# Patient Record
Sex: MALE
[De-identification: names, ages, dates, MRNs, and addresses within clinical notes are randomized per-mention and may not be internally consistent; named-entity substitution may affect disease eponyms.]

---

## 2019-12-11 ENCOUNTER — HOSPITAL ENCOUNTER (OUTPATIENT)
Dept: HOSPITAL 97 - 4TH | Age: 64
Setting detail: OBSERVATION
LOS: 1 days | Discharge: HOME | End: 2019-12-12
Attending: INTERNAL MEDICINE | Admitting: INTERNAL MEDICINE
Payer: COMMERCIAL

## 2019-12-11 VITALS — OXYGEN SATURATION: 98 %

## 2019-12-11 VITALS — BODY MASS INDEX: 29 KG/M2

## 2019-12-11 DIAGNOSIS — I10: ICD-10-CM

## 2019-12-11 DIAGNOSIS — Z96.652: ICD-10-CM

## 2019-12-11 DIAGNOSIS — R07.9: Primary | ICD-10-CM

## 2019-12-11 DIAGNOSIS — J30.9: ICD-10-CM

## 2019-12-11 DIAGNOSIS — Z23: ICD-10-CM

## 2019-12-11 DIAGNOSIS — E78.2: ICD-10-CM

## 2019-12-11 DIAGNOSIS — N40.0: ICD-10-CM

## 2019-12-11 LAB
ALBUMIN SERPL BCP-MCNC: 3.8 G/DL (ref 3.4–5)
ALP SERPL-CCNC: 78 U/L (ref 45–117)
ALT SERPL W P-5'-P-CCNC: 23 U/L (ref 12–78)
AST SERPL W P-5'-P-CCNC: 17 U/L (ref 15–37)
BUN BLD-MCNC: 18 MG/DL (ref 7–18)
CKMB CREATINE KINASE MB: < 1 NG/ML (ref 0.3–3.6)
GLUCOSE SERPLBLD-MCNC: 102 MG/DL (ref 74–106)
HCT VFR BLD CALC: 36.3 % (ref 39.6–49)
LYMPHOCYTES # SPEC AUTO: 0.7 K/UL (ref 0.7–4.9)
MAGNESIUM SERPL-MCNC: 2.5 MG/DL (ref 1.8–2.4)
PMV BLD: 7.5 FL (ref 7.6–11.3)
POTASSIUM SERPL-SCNC: 5.2 MMOL/L (ref 3.5–5.1)
RBC # BLD: 3.85 M/UL (ref 4.33–5.43)
TROPONIN I: < 0.02 NG/ML (ref 0–0.04)
TSH SERPL DL<=0.05 MIU/L-ACNC: 1.02 UIU/ML (ref 0.36–3.74)
UA DIPSTICK W REFLEX MICRO PNL UR: (no result)

## 2019-12-11 PROCEDURE — 84443 ASSAY THYROID STIM HORMONE: CPT

## 2019-12-11 PROCEDURE — 93970 EXTREMITY STUDY: CPT

## 2019-12-11 PROCEDURE — 84484 ASSAY OF TROPONIN QUANT: CPT

## 2019-12-11 PROCEDURE — 82550 ASSAY OF CK (CPK): CPT

## 2019-12-11 PROCEDURE — 78452 HT MUSCLE IMAGE SPECT MULT: CPT

## 2019-12-11 PROCEDURE — 93306 TTE W/DOPPLER COMPLETE: CPT

## 2019-12-11 PROCEDURE — 85025 COMPLETE CBC W/AUTO DIFF WBC: CPT

## 2019-12-11 PROCEDURE — 83735 ASSAY OF MAGNESIUM: CPT

## 2019-12-11 PROCEDURE — 93017 CV STRESS TEST TRACING ONLY: CPT

## 2019-12-11 PROCEDURE — 71275 CT ANGIOGRAPHY CHEST: CPT

## 2019-12-11 PROCEDURE — 36415 COLL VENOUS BLD VENIPUNCTURE: CPT

## 2019-12-11 PROCEDURE — 90471 IMMUNIZATION ADMIN: CPT

## 2019-12-11 PROCEDURE — 85379 FIBRIN DEGRADATION QUANT: CPT

## 2019-12-11 PROCEDURE — 80053 COMPREHEN METABOLIC PANEL: CPT

## 2019-12-11 PROCEDURE — 71046 X-RAY EXAM CHEST 2 VIEWS: CPT

## 2019-12-11 PROCEDURE — 82553 CREATINE MB FRACTION: CPT

## 2019-12-11 PROCEDURE — 81003 URINALYSIS AUTO W/O SCOPE: CPT

## 2019-12-11 PROCEDURE — 93005 ELECTROCARDIOGRAM TRACING: CPT

## 2019-12-11 PROCEDURE — 90670 PCV13 VACCINE IM: CPT

## 2019-12-11 NOTE — ECHO
HEIGHT: 5 ft 6 in   WEIGHT: 180 lb 0 oz   DATE OF STUDY: 12/11/19   REFER DR: Mando Albert MD

2-DIMENSIONAL: YES

         M.MODE: YES

     DOPPLER: YES

    COLOR FLOW: YES

    

                        TDS:  NO

    PORTABLE: NO

     DEFINITY:  NO

    BUBBLE STUDY: NO

    

    

DIAGNOSIS:  CONGESTIVE HEART FAILURE



CARDIAC HISTORY:  

CATHERIZATION: NO

    SURGERY: NO

    PROSTHETIC VALVE: NO

    PACEMAKER: NO

    

    

MEASUREMENTS (cm)

    DIASTOLIC (NORMALS)                 SYSTOLIC (NORMALS)

IVSd                 1.0 (0.6-1.2)                    LA Diam 3.3 (1.9-4.0)     LVEF       
  57%  

LVIDd               3.8 (3.5-5.7)                        LVIDs      2.7 (2.0-3.5)     %FS  
        30%

LVPWd             1.0 (0.6-1.2)

Ao Diam           2.7 (2.0-3.7)



2 DIMENSIONAL ASSESSMENT:

RIGHT ATRIUM:                   NORMAL

    LEFT ATRIUM:       NORMAL

    

    RIGHT VENTRICLE:            NORMAL

    LEFT VENTRICLE: NORMAL

    

    TRICUSPID VALVE:             NORMAL

    MITRAL VALVE:     NORMAL

    

    PULMONIC VALVE:             NORMAL

    AORTIC VALVE:     MILD SCLEROSIS

    

    PERICARDIAL EFFUSION: NONE

    AORTIC ROOT:      NORMAL

    

    

LEFT VENTRICULAR WALL MOTION:     NORMAL.



DOPPLER/COLOR FLOW:     NO AORTIC STENOSIS OR AORTIC REGURGITATION. NORMAL DOPPLER.



COMMENTS:      NORMAL LEFT VENTRICULAR EJECTION FRACTION. AORTIC SCLEROSIS WITH NO AORTIC 
STENOSIS/ AORTIC REGURGITATION. 



TECHNOLOGIST:   JAZMINE HARRISON

## 2019-12-11 NOTE — RAD REPORT
EXAM DESCRIPTION:  RAD - Chest Pa And Lat (2 Views) - 12/11/2019 11:52 am

 

CLINICAL HISTORY:  chest pain

Chest pain.

 

COMPARISON:  CHEST PA AND LAT 2 VIEW dated 2/10/2011; CHEST PA AND LAT 2 VIEW dated 1/4/2005

 

FINDINGS:  The lungs are clear. The heart is normal in size. No displaced fractures.

 

IMPRESSION:  No acute or concerning finding suspected.

## 2019-12-11 NOTE — EKG
Test Date:    2019-12-11               Test Time:    11:59:33

Technician:   GERDA                                     

                                                     

MEASUREMENT RESULTS:                                       

Intervals:                                           

Rate:         68                                     

AR:           196                                    

QRSD:         94                                     

QT:           396                                    

QTc:          421                                    

Axis:                                                

P:            25                                     

AR:           196                                    

QRS:          -16                                    

T:            20                                     

                                                     

INTERPRETIVE STATEMENTS:                                       

                                                     

Normal sinus rhythm

Normal ECG

Compared to ECG 01/04/2005 14:16:00

No significant changes



Electronically Signed On 12-11-19 13:22:59 CST by Vito Noyola

## 2019-12-11 NOTE — RAD REPORT
EXAM DESCRIPTION:  US - Extrem Venous W Compress Ceasar - 12/11/2019 6:02 pm

 

CLINICAL HISTORY:  rule out DVT

Bilateral leg edema and swelling.

 

COMPARISON:  No comparisons

 

TECHNIQUE:  Real-time sonographic interrogation of the left and right lower extremity deep venous sys
tems was performed.

 

FINDINGS:  Normal compressibility, flow augmentation, phasic flow and spontaneous flow is identified 
in both the left and right lower extremity deep venous systems.

 

IMPRESSION:  No sonographic evidence of left or right lower extremity deep venous thrombosis.

## 2019-12-11 NOTE — RAD REPORT
EXAM DESCRIPTION:  CT - Chest For Pe Angio - 12/11/2019 2:41 pm

 

CLINICAL HISTORY:   Chest pain

 

COMPARISON:  December 11, 2019 chest x-ray

 

TECHNIQUE:  Dynamically enhanced axial 3 mm thick images of the chest were obtained during administra
tion of <100> mL Isovue 370 IV contrast. Coronal and oblique reconstruction images were generated and
 reviewed. Exam utilizes a protocol for optimal evaluation of pulmonary arterial tree.

 

Maximum intensity projections 3D imaging was utilized

 

All CT scans are performed using dose optimization technique as appropriate and may include automated
 exposure control or mA/KV adjustment according to patient size.

 

FINDINGS:  A pulmonary embolus is not seen.

 

A thoracic aortic aneurysm is not noted.

 

A pleural effusion is not seen. A pericardial effusion is not seen.

 

A lung consolidation is not present.

 

IMPRESSION:  Negative for a pulmonary embolism.

## 2019-12-11 NOTE — XMS REPORT
Patient Summary Document

 Created on:2019



Patient:JEREMIAH NAYAK

Sex:Male

:1955

External Reference #:[id number]





Demographics







 Address  [address] [place] [address]

 

 Home Phone  [phone]

 

 Work Phone  [phone]

 

 Email Address  [email or web address]

 

 Preferred Language  Unknown

 

 Marital Status  Unknown

 

 Jehovah's witness Affiliation  Unknown

 

 Race  Unknown

 

 Additional Race(s)  Unavailable

 

 Ethnic Group  Unknown









Author







 Organization  [facility]

 

 Address  [address]



   [place] [address]

 

 Phone  [phone]









Support







 Name  Relationship  Address  Phone

 

 YEFRI NAYAK  Unavailable  [address]  [phone]



     [place] [address]  

 

 YEFRI NAYAK  Unavailable  [address]  [phone]



     [place] [address]  

 

 JEREMIAH NAYAK  Unavailable  .  [phone]









Care Team Providers







 Name  Role  Phone

 

 Unavailable  Unavailable  Unavailable









Payers







 Payer Name  Policy Type  Policy Number  Effective Date  Expiration Date







Problems

This patient has no known problems.



Allergies, Adverse Reactions, Alerts







 Allergy  Allergy  Status  Severity  Reaction(s)  Onset  Inactive  Treating  
Comments



 Name  Type        Date  Date  Clinician  

 

 No Known  DA  Active  U    2018-10      



 Drug          -      



 Allergies          00:00:0      



           0      

 

 pollen  DA  Active  SV    2018-10      



 extracts          -      



           00:00:0      



           0      

 

 No Known  DA  Active  U    2018-10      



 Allergies                



           00:00:0      



           0      







Medications

This patient has no known medications.



Results







 Test Description  Test Time  Test Comments  Text Results  Atomic Results  
Result Comments









 - XR KNEE 1 OR 2 V   2019 15:14:00     Patient Name: JEREMIAH NAYAK  



             Unit No: [id number]        EXAMS:  



         



             CPT CODE:       [id number] XR KNEE  



       1 OR 2 V LT  



       [address]                      LEFT KNEE 2  



       VIEWS PORTABLE                 COMMENT:  



                       The patient is status  



       post joint replacement which is  



       articulating       normally.  



               ** Electronically Signed by  



       Edmund Galaviz MD **            **  



                on 2019 at 1514  



         **                      Reported and  



       signed by: Edmund Galaviz MD  



                                           CC:  



       Joe Holly MD  



         



         



                         Technologist: ARIS BROWN (RT.R)  



               Transcribed D/T: 2019  



       (0083) [id number].[id number]  



             [facility]  



         NAME: JEREMIAH NAYAK  



        [address]  



       PHYS: Joe Sanchez MD  



                                          :  



       1955 AGE: 64      SEX: M  



       [place], [place] [address]  



       ACCT NO: [id number] LOC: Y[address]  



       PHONE #: [phone]  



       EXAM DATE: 2019 STATUS: DIS IN  



        FAX #: [phone]               RAD  



       #:             D/C DT   2019  



       PAGE  1                       Signed  



       Report  



       Patient Name: JEREMIAH NAYAK  



            Unit No: [id number]        EXAMS:  



         



            CPT CODE:       [id number] XR KNEE  



       1 OR 2 V LT  



       [id number]                <Continued>  Orig  



       Print D/T: S: 2019 (1517)  



         



                               [facility]           NAME:  



       JEREMIAH NAYAK              [address]  



       [facility]                     PHYS:  



       Joe Sanchez MD  



                                    :  



       1955 AGE: 64      SEX: M  



       [place], [place] [address]  



       ACCT NO: [id number] LOC: Y[address] A  



       PHONE #: [phone]  



       EXAM DATE: 2019 STATUS: DIS IN  



        FAX #: [phone]               RAD  



       #:             D/C DT   2019  



       PAGE  2                       Signed  



       Report  









 BASIC METABOLIC PANEL  2019 06:51:00    









   Test Item  Value  Reference Range  Comments









 SODIUM (test code=NA)  138 mmol/L  136-145  

 

 POTASSIUM (test code=K)  4.7 mmol/L  3.5-5.1  

 

 CHLORIDE (test code=CL)  101.0 mmol/L    

 

 CARBON DIOXIDE (test code=CO2)  25.1 mmol/L  21-32  

 

 GLUCOSE (test code=GLU)  110 mg/dL    

 

 BLOOD UREA NITROGEN (test  15 mg/dL  7-18  



 code=BUN)      

 

 GLOMERULAR FILTRATION RATE (test  79.3  >60  Unit of measure:   mL/min/1.73



 code=GFR)      [id number] Range:Healthy



       Adults          >90



       mL/min/1.73 m2 For Chronic



       Kidney Disease:     Stage II



         Mild Decrease in GFR



       60-90     Stage III



       Moderate Decrease in GFR



       30-59     Stage IV     Severe



       Decrease in GFR      15-29



       Stage V      Kidney Failure



                  <15

 

 CREATININE (test code=CREAT)  0.95 mg/dL  0.55-1.30  

 

 CALCIUM (test code=CA)  7.3 mg/dL  8.2-10.1  



HGB   [id number] 05:48:00





 Test Item  Value  Reference Range  Comments

 

 HEMOGLOBIN (test code=HGB)  11.2 g/dL  12-16  

 

 HEMATOCRIT (test code=HCT)  33.2 %  37-47  



COMPREHENSIVE METABOLIC PANEL2019-10-31 13:03:00





 Test Item  Value  Reference Range  Comments

 

 SODIUM (test code=NA)  139 mmol/L  136-145  

 

 POTASSIUM (test code=K)  4.2 mmol/L  3.5-5.1  

 

 CHLORIDE (test code=CL)  102.0 mmol/L    

 

 CARBON DIOXIDE (test code=CO2)  27.5 mmol/L  21-32  

 

 GLUCOSE (test code=GLU)  96 mg/dL    

 

 BLOOD UREA NITROGEN (test  16 mg/dL  7-18  



 code=BUN)      

 

 GLOMERULAR FILTRATION RATE  91.4  >60  Unit of measure:



 (test code=GFR)      mL/min/1.73 [id number]



       Range:Healthy Adults



          >90 mL/min/1.73 m2 For



       Chronic Kidney Disease:



        Stage II     Mild



       Decrease in GFR



       60-90     Stage III



       Moderate Decrease in GFR



        30-59     Stage IV



       Severe Decrease in GFR



        15-29     Stage V



       Kidney Failure



         <15

 

 CREATININE (test code=CREAT)  0.84 mg/dL  0.55-1.30  

 

 TOTAL PROTEIN (test code=PROT)  7.2 g/dL  6.4-8.2  

 

 ALBUMIN (test code=ALB)  4.2 g/dL  3.4-5.0  

 

 GLOBULIN (test code=GLOB)  3.0 g/dL  2.2-4.2  

 

 ALBUMIN/GLOBULIN RATIO (test  1.4  0.7-2.0  



 code=A/G)      

 

 CALCIUM (test code=CA)  9.4 mg/dL  8.2-10.1  

 

 BILIRUBIN TOTAL (test  0.60 mg/dL  0.2-1.00  



 code=BILT)      

 

 SGOT/AST (test code=AST)  34.0 U/L  15-37  

 

 SGPT/ALT (test code=ALT)  34.0 U/L  12-78  Please note new normal



       range.

 

 ALKALINE PHOSPHATASE TOTAL  53 U/L    



 (test code=ALKP)      



CBC W/AUTO DIFF2019-10-31 12:43:00





 Test Item  Value  Reference Range  Comments

 

 WHITE BLOOD CELL (test code=WBC)  5.3 K/mm3  5.7-10.5  

 

 RED BLOOD CELL (test code=RBC)  4.40 M/mm3  4.2-5.4  

 

 HEMOGLOBIN (test code=HGB)  13.7 g/dL  12-16  

 

 HEMATOCRIT (test code=HCT)  40.7 %  37-47  

 

 MEAN CELL VOLUME (test code=MCV)  93 fL  80-98  

 

 MEAN CELL HGB (test code=MCH)  31.1 pg  27-34  

 

 MEAN CELL HGB CONCENTRATION (test code=MCHC)  33.7 g/dL  30.8-34.1  

 

 RED CELL DISTRIBUTION WIDTH (test code=RDW)  12.7 %  11-16  

 

 PLT (test code=PLT)  258 K/mm3  130-400  

 

 MEAN PLATELET VOLUME (test code=MPV)  9.8 fL  8.9-12.1  

 

 NEUTROPHIL % (test code=NT%)  62.8 %  45-70  

 

 LYMPHOCYTE % (test code=LY%)  24.4 %  20-40  

 

 MONOCYTE % (test code=MO%)  9.7 %  3-10  

 

 EOSINOPHIL % (test code=EO%)  1.9 %  1-5  

 

 BASOPHIL % (test code=BA%)  0.8 %  0.0-1.1  

 

 NEUTROPHIL # (test code=NT#)  3.32 K/mm3  2.00-7.50  

 

 LYMPHOCYTE # (test code=LY#)  1.29 K/mm3  1.50-4.00  

 

 MONOCYTE # (test code=MO#)  0.51 K/mm3  0.2-0.8  

 

 EOSINOPHIL # (test code=EO#)  0.10 K/mm3  0.04-0.4  

 

 BASOPHIL # (test code=BA#)  0.04 K/mm3  0.02-0.10  

 

 MANUAL DIFF REQUIRED (test code=MDIFF)  NO  MANUAL DIFF  

 

 NUCLEATED RED BLOOD CELL (test code=NRBC)  0 %  0-0

## 2019-12-12 VITALS — SYSTOLIC BLOOD PRESSURE: 120 MMHG | TEMPERATURE: 98.7 F | DIASTOLIC BLOOD PRESSURE: 75 MMHG

## 2019-12-12 NOTE — RAD REPORT
EXAM DESCRIPTION:  NM - Rest Stress Cardiac Imaging - 12/12/2019 9:27 am

 

CLINICAL HISTORY:  CP

Chest pain.

 

COMPARISON:  No comparisons

 

TECHNIQUE:  The patient was administered approximately 10mCi of Tc 99m Sestamibi prior to resting SPE
CT imaging of the heart. The patient was then administered approximately 30 mCi of Tc 99m Sestamibi f
ollowing exercise or pharmacologic stress. Multiplanar SPECT images were reviewed.

 

FINDINGS:  No stress induced ischemic defect is seen to suggest stress induced ischemia. No fixed def
ect is seen to suggest hibernating myocardium or scarred myocardium.

 

The end diastolic volume is 94 ml, the end systolic volume is 40 ml, and the ejection fraction is 57 
%.

 

 

IMPRESSION:  No stress induced ischemia.

## 2019-12-12 NOTE — TREADPHA
DX:  CONGESTIVE HEART FAILURE



Date of Study: 12/12/2019





Ht: 5 6 

Wt:  179 lb 12.8 oz



Consulting Physician:  FLOR







MEDICATIONS:  ASPIRIN



HISTORY:  64 YEAR OLD MALE, COMLAINTS OF CHEST PAIN, HISTORY OF HYPERTENSION, 
HYPERLIPIDEMIA, NON-SMOKER, OCCASIONAL DRINKER.



PHYSICIAL EXAMINATION: 

            



RESTING B.P.:  137/85

RESTING H.R.:  81





RESTING EKG:  NORMAL



                    

            

PROTOCOL:  PHARMACOLOGIC

EXERCISE TIME:  3:30 

B.P. AT PEAK STRESS:  129/82





IMPRESSION:  LEXISCAN INJECTED FOLLOWED BY CARDIOLITE PER PROTOCOL.  SEE NUCLEAR MEDICINE 
REPORT.  NO SUPRAVENTRICULAR TACHYCARDIA, VENTRICULAR TACHYCARDIA, PREMATURE ATRIAL 
COMPLEXES, PREMATURE VENTRICULAR COMPLEXES.  PATIENT REPORTED CHEST PAIN, THREE OUT OF TEN 
ON PAIN SCALE.

## 2019-12-12 NOTE — HP
Date of Admission:  12/11/2019



Chief Complaint:  Chest pain.



History Of Present Illness:  This is a 64-year-old male patient, who came in to office today for his 
routine physical exam and informed me that he had left knee replacement surgery done on November 19, 2019.  This was done in [place]; and upon further questioning, he informed me that he did not take an
y anticoagulation therapy for DVT prophylaxis after he was discharged from hospital.  He stayed in [facility] overnight.  He is recovering well from this surgery, but also reported that for last 2 weeks h
e is having chest pain and chest pain is just lateral to the nipple area on both side and it lasts of
f and on for about hour or so in morning time.  Denies any association with meal or activity.  No oth
er associated symptoms like nausea, vomiting, diaphoresis.  No radiation of pain.  No shortness of br
eath.  No cough, cold, congestion.  No rash.  After the patient was evaluated at the office, decision
 was made to admit him to hospital for further evaluation and management of his chest pain problem.



Allergies:  NO KNOWN ALLERGIES.



Medications:  List reviewed.



Review of Systems:

Cardiovascular:  As mentioned above. 



All other systems reviewed and negative.



Past Medical History:  Allergic rhinitis, impaired fasting glucose, hypertension, mixed hyperlipidemi
a, benign prostatic hypertrophy, high PSA for which he had negative prostate biopsy, leukocytopenia, 
anemia, osteoporosis.



Past Surgical History:  Significant for prostate biopsy, bilateral knee replacement surgery, and surg
syed on his thumb and foot surgery.



Family History:  Father had Alzheimer disease.  Mother, hypertension and benign brain tumor.



Social History:  Prior history of smoking not at present time.  Use of alcohol, drinks about 6-12 bee
rs a week.



Physical Examination:

Vital Signs:  /65, pulse 76, respiratory rate 16, temperature 98.1.  Weight 178 pounds, height 
67 inches. 

General:  Awake, alert, oriented, not in distress. 

HEENT:  Head atraumatic, normocephalic.  Conjunctivae nonerythematous.  Sclerae white.  Mouth, no thr
ush or edema noted.  Ears/Nose, no mass, lesion, discharge noted. 

Neck:  Supple. No JVD, lymph nodes, bruit, thyromegaly noted. 

Lungs:  Bilateral good equal air entry. Clear to auscultation. No rhonchi.  No rales. 

Heart:  Normal heart sounds, no murmur or gallop. 

Abdomen:  Soft, bowel sounds normal. No guarding, rigidity, tenderness, mass, hepatosplenomegaly, dis
tention, or bruit noted. 

Extremities:  Left knee has surgical scar from recent surgery of knee replacement and his left knee s
urgical scar is healing well.  No evidence of any redness, swelling, discharge, bleeding.  Patient ha
s some bruising in the left lower leg, which is expected from recent surgery. 

Skin:  No rash, ulcer, cellulitis. 

Lymphatics:  No lymph node enlargement in neck, supraclavicular, infraclavicular region. 

Neuro:  No focal neurological deficit. 

Chest:  Unremarkable. 

External Genitalia:  Deferred. 

Rectal:  Deferred.



Laboratory Data:  White count 5.3, hemoglobin 12.4, platelets 306.  Sodium 138, potassium 5.2, chlori
de 105, bicarb 31, BUN 18, creatinine 0.97, glucose 102.  Liver function tests unremarkable.  Troponi
n less than 0.02.  TSH 1.02.  D-dimer elevated at 6441.  Urinalysis negative.  Chest x-ray, no acute 
cardiopulmonary changes.  EKG, normal sinus rhythm, normal EKG.



Impression:  

1.Chest pain.

2.Hypertension.

3.Mixed hyperlipidemia.

4.Benign prostatic hypertrophy.

5.Impaired fasting glucose.

6.Allergic rhinitis.



Plan:  Admit patient to hospital for further evaluation and management of this problem.  Patient is a
ppropriate for observation.  Patient's cardiac enzymes are negative.  EKG is unremarkable.  Our ramana
rn is that we need to rule out possibility of pulmonary embolism and CAT scan of the chest per PE pro
tocol was done after patient was admitted to the hospital, which came back negative for pulmonary emb
olism and venous Doppler of leg was negative for DVT.  Tomorrow morning, we will do Lexiscan stress t
est.  Echocardiogram done today was unremarkable.  I will see him tomorrow morning for followup.  Laurie everett was given aspirin 162 mg after admission to the hospital and will continue aspirin 81 mg daily.





BOO/MODL

DD:  12/11/2019 19:55:29Voice ID:  [id number]

## 2019-12-13 NOTE — DS
Date of Discharge:  12/12/2019



Disposition:  Discharged to go home.



Physical Examination:

HEENT:  Unremarkable. 

Lungs:  Clear to auscultation. 

Heart:  Sounds normal. 

Abdomen:  Soft.  Bowel sounds normal.  No guarding, rigidity, tenderness, or 
distention. 

Extremities:  No leg edema.



Final Diagnoses:  

1.   Chest pain.

2.   Hypertension.

3.   Mixed hyperlipidemia.

4.   Benign prostatic hypertrophy.

5.   Impaired fasting glucose.

6.   Allergic rhinitis.



Hospital Course:  A 64-year-old pleasant male patient, who was admitted to the 
hospital after he came into office and complaining of chest pain.  Please see 
dictated H and P for more information.  Cardiac enzymes remained negative.  
Chest x-ray was negative.  CAT scan of the chest per PE protocol was negative 
for pulmonary embolism or any other acute lung findings.  The patient had an 
echocardiogram done, which was unremarkable and today he had a negative 
Lexiscan stress test.  Patient's chest pain, I will consider that to be 
musculoskeletal in nature.  He had some anti-inflammatory medication, which is 
ketorolac and this was prescribed to him by his orthopedic surgeon after recent 
knee surgery and he was advised to take that and I will see him next week on 
Wednesday for followup.





BOO/KATHRYN

DD:  12/12/2019 21:13:58   Voice ID:  [id number]

DT:  12/13/2019 04:01:39   Report ID:  [id number]

MARILIN

## 2020-08-30 ENCOUNTER — HOSPITAL ENCOUNTER (EMERGENCY)
Dept: HOSPITAL 97 - ER | Age: 65
Discharge: LEFT BEFORE DISCHARGE | End: 2020-08-30
Payer: COMMERCIAL

## 2020-08-30 DIAGNOSIS — Z53.21: Primary | ICD-10-CM

## 2020-08-30 PROCEDURE — 99281 EMR DPT VST MAYX REQ PHY/QHP: CPT

## 2020-08-30 NOTE — ER
Nurse's Notes                                                                                     

 [facility]                                                                 

Name: Dereck Dc                                                                         

Age: 64 yrs                                                                                       

Sex: Male                                                                                         

: 1955                                                                                   

MRN: [id number]                                                                                   

Arrival Date: 2020                                                                          

Time: 13:43                                                                                       

Account#: [id number]                                                                            

Bed Waiting                                                                                       

Private MD:                                                                                       

Diagnosis:                                                                                        

                                                                                                  

Presentation:                                                                                     

                                                                                             

13:47 Chief complaint: Patient states: sore throat, nasal drip since Monday. Had a +COVID on  sv  

      last week and had another test and they said it was negative but wants to know which        

      one it is. Pt requesting a COVID test. Coronavirus screen: Client denies travel out of      

      the [place] in the last 14 days. runny nose, sore throat. Ebola Screen: No symptoms or         

      risks identified at this time. Risk Assessment: Do you want to hurt yourself or someone     

      else? Patient reports no desire to harm self or others. Onset of symptoms was 2020.                                                                                   

13:47 Method Of Arrival: Ambulatory                                                           sv  

13:47 Acuity: TD 4                                                                           sv  

13:51 Initial Sepsis Screen: Does the patient meet any 2 criteria? HR > 90 bpm. No. Patient's sv  

      initial sepsis screen is negative. Does the patient have a suspected source of              

      infection? No. Patient's initial sepsis screen is negative.                                 

                                                                                                  

Triage Assessment:                                                                                

13:47 General: Appears in no apparent distress. comfortable, Behavior is calm, cooperative,   sv  

      appropriate for age. Pain: Complains of pain in throat. EENT: Reports pain when             

      swallowing. Neuro: Level of Consciousness is awake, alert, obeys commands, Gait is          

      steady. Respiratory: Respiratory effort is even, unlabored.                                 

                                                                                                  

Historical:                                                                                       

- Allergies:                                                                                      

13:49 No Known Allergies;                                                                     sv  

                                                                                                  

                                                                                                  

                                                                                                  

Vital Signs:                                                                                      

13:51  / 76; Pulse 103; Resp 16; Temp 99.1; Pulse Ox 100% ; Weight 83.91 kg; Height 5   sv  

      ft. 6 in. (167.64 cm);                                                                      

13:51 Body Mass Index 29.86 (83.91 kg, 167.64 cm)                                             sv  

                                                                                                  

ED Course:                                                                                        

13:43 Patient arrived in ED.                                                                  mr  

13:47 Arm band placed on.                                                                     sv  

13:49 Triage completed.                                                                       sv  

                                                                                                  

Administered Medications:                                                                         

No medications were administered                                                                  

                                                                                                  

                                                                                                  

Outcome:                                                                                          

14:32 Eloped from waiting room, before seeing physician Time discovered patient gone:  at 14:32                                                                           

14:32 Patient left the ED.                                                                    sv  

                                                                                                  

Signatures:                                                                                       

Lorenza Dan RN                    RN   sv                                                   

MjAmanda                                 mr                                                   

                                                                                                  

Corrections: (The following items were deleted from the chart)                                    

13:52 13:47 Chief complaint: Patient states: sore throat, nasal drip since Monday sv          sv  

                                                                                                  

**************************************************************************************************

## 2020-08-30 NOTE — XMS REPORT
Continuity of Care Document

                           Created on:2020



Patient:JEREMIAH NAYAK

Sex:Male

:1955

External Reference #:[id number]





Demographics







                          Address                   [address] [place] [address]

 

                          Home Phone                [phone]

 

                          Work Phone                [phone]

 

                          Email Address             [email or web address]

 

                          Preferred Language        English

 

                          Marital Status            Unknown

 

                          Amish Affiliation     Unknown

 

                          Race                      Unknown

 

                          Additional Race(s)        Unavailable

 

                          Ethnic Group              Unknown









Author







                          Organization              [facility]

t

 

                          Address                   [address]



                                                    [place] [address]

 

                          Phone                     [phone]









Support







                Name            Relationship    Address         Phone

 

                YEFRI NAYAK Unavailable     [address] [facility] [phone]

[phone]



                                                [place] [address] 

 

                YEFRI NAYAK Unavailable     [address] [facility] [phone]

[phone]



                                                [place] [address] 

 

                JEREMIAH NAYAK Unavailable     .               [phone]

[phone]









Care Team Providers







                    Name                Role                Phone

 

                    Unavailable         Unavailable         Unavailable









Payers







           Payer Name Policy Type Policy Number Effective Date Expiration Date S

ource







Problems

This patient has no known problems.



Allergies, Adverse Reactions, Alerts







       Allergy Allergy Status Severity Reaction(s) Onset  Inactive Treating Comm

ents 

Source



       Name   Type                        Date   Date   Clinician        

 

       No Known DA     Active U             2018                      [facility]



       Drug                               0-30                        [facility]



       Allergie                             00:00:                      [place]



       s                                  00                          [facility]

 

       pollen DA     Active SV            2018                      [facility]



       extracts                             0-30                        [facility]



                                          00:00:                      [place]



                                          00                          [facility]

 

       No Known DA     Active U             2018                      [facility]



       Allergie                             0-30                        [place]



       s                                  00:00:                      [facility]



                                          00                          [facility]



                                                                      [facility]



                                                                      l







Medications

This patient has no known medications.



Procedures

This patient has no known procedures.



Results







           Test Description Test Time  Test Comments Results    Result Comments 

Source

 

           - XR KNEE 1 OR 2 V 2019             Patient Name:            



           LT         15:14:00              JEREMIAH NAYKA            



                                                       Unit No:            



                                            [id number]            



                                            EXAMS:                



                                                                  



                                                         CPT            



                                            CODE:                 



                                            [id number] XR KNEE 1            



                                            OR 2 V LT             



                                                         [id number]            



                                                                  



                                            LEFT KNEE 2 VIEWS            



                                            PORTABLE              



                                                 COMMENT:            



                                                      The            



                                            patient is status            



                                            post joint            



                                            replacement which            



                                            is articulating            



                                              normally.            



                                                       **            



                                            Electronically            



                                            Signed by Edmund Galaviz MD **            



                                                **                



                                            on 2019 at            



                                            1514            **            



                                                                  



                                            Reported and signed            



                                            by: Edmund Galaviz MD            



                                                                  



                                                           CC:            



                                            Joe Holly MD            



                                                                  



                                                                  



                                                                  



                                                                  



                                                                  



                                                                  



                                            Technologist:            



                                            ARIS BROWN (RT.R)            



                                                                  



                                                                  



                                            Transcribed D/T:            



                                            2019 (7589)            



                                            [email or web address]             



                                                                  



                                            [facility]              



                                            NAME:                 



                                            JEREMIAH NAYAK            



                                                         [address] [place] [address]            



                                                       PHYS:            



                                            Joe Sanchez MD            



                                                                  



                                                                  



                                             : 1955            



                                            AGE: 64      SEX: M            



                                              [place], [place]            



                                            [address]                 



                                             ACCT NO:             



                                            [id number] LOC:            



                                            Y.315 A      PHONE            



                                            #: [phone]            



                                                      EXAM            



                                            DATE: 2019            



                                            STATUS: DIS IN            



                                            FAX #: [phone]            



                                                          RAD            



                                            #:             D/C            



                                            DT   2019            



                                            PAGE  1               



                                                      Signed            



                                            Report                



                                                                  



                                            Patient Name:            



                                            JEREMIAH NAYAK            



                                                       Unit No:            



                                            [id number]            



                                            EXAMS:                



                                                                  



                                                         CPT            



                                            CODE:                 



                                            [id number] XR KNEE 1            



                                            OR 2 V LT             



                                                         [id number]            



                                                                  



                                            <Continued>  Orig            



                                            Print D/T: S:            



                                            2019 (1193)            



                                                                  



                                                                  



                                                                  



                                                      [facility]            



                                                      NAME:            



                                            JEREMIAH NAYAK            



                                                         [address] [place] [address]            



                                                       PHYS:            



                                            Joe Sanchez MD            



                                                                  



                                                                  



                                             : 1955            



                                            AGE: 64      SEX: M            



                                              [place], [place]            



                                            [address]                 



                                             ACCT NO:             



                                            [id number] LOC:            



                                            Y.315 A      PHONE            



                                            #: [phone]            



                                                      EXAM            



                                            DATE: 2019            



                                            STATUS: DIS IN            



                                            FAX #: [phone]            



                                                          RAD            



                                            #:             D/C            



                                            DT   2019            



                                            PAGE  2               



                                                      Signed            



                                            Report                



                                                                  









                    BASIC METABOLIC PANEL 2019 06:51:00 









                      Test Item  Value      Reference Range Interpretation Comme

nts









             SODIUM (test code = NA) 138 mmol/L   136-145      N            

 

             POTASSIUM (test code = K) 4.7 mmol/L   3.5-5.1      N            

 

             CHLORIDE (test code = CL) 101.0 mmol/L        N            

 

             CARBON DIOXIDE (test code = 25.1 mmol/L  21-32        N            



             CO2)                                                

 

             GLUCOSE (test code = GLU) 110 mg/dL           N            

 

             BLOOD UREA NITROGEN (test code 15 mg/dL     7-18         N         

   



             = BUN)                                              

 

             GLOMERULAR FILTRATION RATE 79.3         >60                       U

nit of measure:



             (test code = GFR)                                        mL/min/1.7

3 [id number]



                                                                 Range:Healthy A

dults



                                                                 >90 mL/min/1.73

 m2 For



                                                                 Chronic Kidney 

Disease:



                                                                 Stage II     Mi

ld Decrease in



                                                                 GFR        60-9

0     Stage



                                                                 III    Moderate

 Decrease in



                                                                 GFR    30-59   

  Stage IV



                                                                 Severe Decrease

 in GFR



                                                                 15-29     Stage

 V      Kidney



                                                                 Failure        

       <15

 

             CREATININE (test code = CREAT) 0.95 mg/dL   0.55-1.30    N         

   

 

             CALCIUM (test code = CA) 7.3 mg/dL    8.2-10.1     L            



HGB   [id number] 05:48:00





             Test Item    Value        Reference Range Interpretation Comments

 

             HEMOGLOBIN (test code = HGB) 11.2 g/dL    12-16        L           

 

 

             HEMATOCRIT (test code = HCT) 33.2 %       37-47        L           

 



COMPREHENSIVE METABOLIC PANEL2019-10-31 13:03:00





             Test Item    Value        Reference Range Interpretation Comments

 

             SODIUM (test code = 139 mmol/L   136-145      N            



             NA)                                                 

 

             POTASSIUM (test code = 4.2 mmol/L   3.5-5.1      N            



             K)                                                  

 

             CHLORIDE (test code = 102.0 mmol/L        N            



             CL)                                                 

 

             CARBON DIOXIDE (test 27.5 mmol/L  21-32        N            



             code = CO2)                                         

 

             GLUCOSE (test code = 96 mg/dL            N            



             GLU)                                                

 

             BLOOD UREA NITROGEN 16 mg/dL     7-18         N            



             (test code = BUN)                                        

 

             GLOMERULAR FILTRATION 91.4         >60                       Unit o

f measure:



             RATE (test code = GFR)                                        mL/mi

n/1.73



                                                                 [id number]



                                                                 Range:Healthy



                                                                 Adults         

 >90



                                                                 mL/min/1.73 m2 

For



                                                                 Chronic Kidney



                                                                 Disease:     St

age



                                                                 II     Mild



                                                                 Decrease in GFR



                                                                    60-90     St

age



                                                                 III    Moderate



                                                                 Decrease in GFR



                                                                 30-59     Stage

 IV



                                                                    Severe Decre

ase



                                                                 in GFR      15-

29



                                                                   Stage V



                                                                 Kidney Failure



                                                                          <15

 

             CREATININE (test code 0.84 mg/dL   0.55-1.30    N            



             = CREAT)                                            

 

             TOTAL PROTEIN (test 7.2 g/dL     6.4-8.2      N            



             code = PROT)                                        

 

             ALBUMIN (test code = 4.2 g/dL     3.4-5.0      N            



             ALB)                                                

 

             GLOBULIN (test code = 3.0 g/dL     2.2-4.2      N            



             GLOB)                                               

 

             ALBUMIN/GLOBULIN RATIO 1.4          0.7-2.0      N            



             (test code = A/G)                                        

 

             CALCIUM (test code = 9.4 mg/dL    8.2-10.1     N            



             CA)                                                 

 

             BILIRUBIN TOTAL (test 0.60 mg/dL   0.2-1.00     N            



             code = BILT)                                        

 

             SGOT/AST (test code = 34.0 U/L     15-37        N            



             AST)                                                

 

             SGPT/ALT (test code = 34.0 U/L     12-78        N            Please

 note new



             ALT)                                                normal range.

 

             ALKALINE PHOSPHATASE 53 U/L              N            



             TOTAL (test code =                                        



             ALKP)                                               



CBC W/AUTO DIFF2019-10-31 12:43:00





             Test Item    Value        Reference Range Interpretation Comments

 

             WHITE BLOOD CELL (test code = WBC) 5.3 K/mm3    5.7-10.5     L     

       

 

             RED BLOOD CELL (test code = RBC) 4.40 M/mm3   4.2-5.4      N       

     

 

             HEMOGLOBIN (test code = HGB) 13.7 g/dL    12-16        N           

 

 

             HEMATOCRIT (test code = HCT) 40.7 %       37-47        N           

 

 

             MEAN CELL VOLUME (test code = MCV) 93 fL        80-98        N     

       

 

             MEAN CELL HGB (test code = MCH) 31.1 pg      27-34        N        

    

 

             MEAN CELL HGB CONCENTRATION (test 33.7 g/dL    30.8-34.1    N      

      



             code = MCHC)                                        

 

             RED CELL DISTRIBUTION WIDTH (test 12.7 %       11-16        N      

      



             code = RDW)                                         

 

             PLT (test code = PLT) 258 K/mm3    130-400      N            

 

             MEAN PLATELET VOLUME (test code = 9.8 fL       8.9-12.1     N      

      



             MPV)                                                

 

             NEUTROPHIL % (test code = NT%) 62.8 %       45-70        N         

   

 

             LYMPHOCYTE % (test code = LY%) 24.4 %       20-40        N         

   

 

             MONOCYTE % (test code = MO%) 9.7 %        3-10         N           

 

 

             EOSINOPHIL % (test code = EO%) 1.9 %        1-5          N         

   

 

             BASOPHIL % (test code = BA%) 0.8 %        0.0-1.1      N           

 

 

             NEUTROPHIL # (test code = NT#) 3.32 K/mm3   2.00-7.50    N         

   

 

             LYMPHOCYTE # (test code = LY#) 1.29 K/mm3   1.50-4.00    L         

   

 

             MONOCYTE # (test code = MO#) 0.51 K/mm3   0.2-0.8      N           

 

 

             EOSINOPHIL # (test code = EO#) 0.10 K/mm3   0.04-0.4     N         

   

 

             BASOPHIL # (test code = BA#) 0.04 K/mm3   0.02-0.10    N           

 

 

             MANUAL DIFF REQUIRED (test code = NO           MANUAL DIFF         

      



             MDIFF)                                              

 

             NUCLEATED RED BLOOD CELL (test 0 %          0-0          N         

   



             code = NRBC)

## 2020-09-02 VITALS — SYSTOLIC BLOOD PRESSURE: 148 MMHG | TEMPERATURE: 99.1 F | OXYGEN SATURATION: 100 % | DIASTOLIC BLOOD PRESSURE: 76 MMHG

## 2022-12-16 ENCOUNTER — HOSPITAL ENCOUNTER (EMERGENCY)
Dept: HOSPITAL 97 - ER | Age: 67
Discharge: HOME | End: 2022-12-16
Payer: COMMERCIAL

## 2022-12-16 VITALS — SYSTOLIC BLOOD PRESSURE: 130 MMHG | DIASTOLIC BLOOD PRESSURE: 76 MMHG | OXYGEN SATURATION: 97 %

## 2022-12-16 DIAGNOSIS — Z96.653: ICD-10-CM

## 2022-12-16 DIAGNOSIS — M79.661: Primary | ICD-10-CM

## 2022-12-16 DIAGNOSIS — I10: ICD-10-CM

## 2022-12-16 PROCEDURE — 99283 EMERGENCY DEPT VISIT LOW MDM: CPT

## 2022-12-16 NOTE — RAD REPORT
EXAM DESCRIPTION:  RAD - Tib Fib Right - 12/16/2022 5:18 pm

 

CLINICAL HISTORY:  fall from ladder

Fall, trauma, pain

 

COMPARISON:  No comparisons

 

FINDINGS:  Total knee arthroplasty is noted. No acute fracture or dislocation. Large plantar calcanea
l spur. Tiny posterior calcaneal spur.

## 2022-12-16 NOTE — ER
Nurse's Notes                                                                                     

 [facility]                                                                 

Name: Dereck Dc                                                                         

Age: 67 yrs                                                                                       

Sex: Male                                                                                         

: 1955                                                                                   

MRN: [id number]                                                                                   

Arrival Date: 2022                                                                          

Time: 15:44                                                                                       

Account#: [id number]                                                                            

Bed 20                                                                                            

Private MD: YOLA Albert C                                                                            

Diagnosis: Pain in right lower leg                                                                

                                                                                                  

Presentation:                                                                                     

                                                                                             

15:49 Chief complaint: Patient states: He fell 3 ft from a ladder and landed on the ladder    kb3 

      rung awkwardly causing pain in the right calf at approximately 1200 today. Pt denies        

      pain in right foot, ankle and knee. Coronavirus screen: Vaccine status: Patient reports     

      receiving the 2nd dose of the covid vaccine. Client denies travel out of the [place] in        

      the last 14 days. Ebola Screen: Patient negative for fever greater than or equal to         

      101.5 degrees Fahrenheit, and additional compatible Ebola Virus Disease symptoms            

      Patient denies exposure to infectious person. Patient denies travel to an                   

      Ebola-affected area in the 21 days before illness onset. No symptoms or risks               

      identified at this time. Initial Sepsis Screen: Does the patient meet any 2 criteria?       

      No. Patient's initial sepsis screen is negative. Does the patient have a suspected          

      source of infection? No. Patient's initial sepsis screen is negative. Risk Assessment:      

      Do you want to hurt yourself or someone else? Patient reports no desire to harm self or     

      others. Onset of symptoms was 2022 at 12:00.                                   

15:49 Method Of Arrival: Ambulatory                                                           3 

15:49 Acuity: TD 3                                                                           kb3 

                                                                                                  

Triage Assessment:                                                                                

15:52 General: Appears in no apparent distress. Behavior is calm, cooperative. Pain:          kb3 

      Complains of pain in right calf Pain does not radiate. Pain currently is 7 out of 10 on     

      a pain scale.                                                                               

16:23 Injury Description: no obvious injury or deformity noted to the right leg. some mild    kc6 

      swelling present.                                                                           

                                                                                                  

Historical:                                                                                       

- Allergies:                                                                                      

15:52 No Known Allergies;                                                                     kb3 

- PMHx:                                                                                           

15:52 Hypercholesterolemia; Hypertensive disorder;                                            kb3 

- PSHx:                                                                                           

15:52 Bilateral knee replacement;                                                             kb3 

                                                                                                  

- Immunization history:: Adult Immunizations up to date, Client reports receiving the             

  2nd dose of the Covid vaccine.                                                                  

- Social history:: Smoking status: Patient denies any tobacco usage or history of.                

                                                                                                  

                                                                                                  

Screenin:19 [facility] ED Fall Risk Assessment (Adult) History of falling in the last 3 months,       kc6 

      including since admission Yes- single mechanical fall (1 pt) Confusion or                   

      Disorientation No (0 pts) Intoxicated or Sedated No (0 pts) Impaired Gait No (0 pts)        

      Mobility Assist Device Used No (0 pt) Altered Elimination No (0 pt) Score/Fall Risk         

      Level 0 - 2 = Low Risk. Abuse screen: Denies threats or abuse. Denies injuries from         

      another. Nutritional screening: No deficits noted. Tuberculosis screening: No symptoms      

      or risk factors identified. Fall Risk Fall in past 12 months (25 points). No secondary      

      diagnosis (0 pts). No IV (0 pts). Ambulatory Aid- None/Bed Rest/Nurse Assist (0 pts).       

      Gait- Normal/Bed Rest/Wheelchair (0 pts) Mental Status- Oriented to own ability (0          

      pts). Total Woods Fall Scale indicates No Risk (0-24 pts).                                  

                                                                                                  

Assessment:                                                                                       

16:20 General: Appears in no apparent distress. comfortable, Behavior is calm, cooperative,   kc6 

      appropriate for age. Pain: Complains of pain in right leg and right calf Pain does not      

      radiate. Pain currently is 7 out of 10 on a pain scale. Quality of pain is described as     

      dull, Pain began suddenly, Is continuous, Alleviated by medications, Aggravated by          

      plantar felxion Also complains of no other associated symptoms. Neuro: Koehler             

      Agitation-Sedation Scale (RASS): 0 - Alert and Calm Level of Consciousness is awake,        

      alert, obeys commands, Oriented to person, place, time, situation, Appropriate for age.     

      Cardiovascular: Heart tones S1 S2 present Capillary refill < 3 seconds. Respiratory:        

      Airway is patent Trachea midline Respiratory effort is even, unlabored, Respiratory         

      pattern is regular, symmetrical, Breath sounds are clear bilaterally. GI: No signs          

      and/or symptoms were reported involving the gastrointestinal system. : No signs           

      and/or symptoms were reported regarding the genitourinary system. EENT: No signs and/or     

      symptoms were reported regarding the EENT system. Derm: No signs and/or symptoms            

      reported regarding the dermatologic system. Skin is intact, Skin is pink, warm \T\ dry.     

      Musculoskeletal: Circulation, motion, and sensation intact. Capillary refill < 3            

      seconds, Range of motion: intact in all extremities, Swelling present in right leg and      

      right calf.                                                                                 

17:16 Reassessment: Patient appears in no apparent distress at this time. No changes from     kc6 

      previously documented assessment. Patient and/or family updated on plan of care and         

      expected duration. Pain level reassessed. Patient is alert, oriented x 3, equal             

      unlabored respirations, skin warm/dry/pink.                                                 

                                                                                                  

Vital Signs:                                                                                      

15:49 Weight 83.91 kg; Height 5 ft. 6 in. (167.64 cm); Pain 7/10;                             kb3 

16:22  / 85; Pulse 88; Resp 18 S; Pulse Ox 98% on R/A; Pain 7/10;                       kc6 

17:16  / 77; Pulse 90; Resp 18 S; Pulse Ox 98% on R/A;                                  kc6 

17:53  / 76; Pulse 85; Resp 15 S; Pulse Ox 97% on R/A; Pain 6/10;                       kc6 

15:49 Body Mass Index 29.86 (83.91 kg, 167.64 cm)                                             kb3 

                                                                                                  

ED Course:                                                                                        

15:44 Patient arrived in ED.                                                                  rg4 

15:45 YOLA Albert MD is Private Physician.                                                       rg4 

15:46 Roberto Mcallister PA is PHCP.                                                                cp  

15:46 Jass Gaviria MD is Attending Physician.                                            cp  

15:51 Triage completed.                                                                       kb3 

15:52 Arm band placed on right wrist.                                                         kb3 

15:59 Asuncion Bartholomew RN is Primary Nurse.                                                 kc6 

16:22 Patient has correct armband on for positive identification. Placed in gown. Bed in low  kc6 

      position. Call light in reach. Side rails up X2.                                            

17:19 XRAY Tib Fib RIGHT In Process Unspecified.                                              EDMS

                                                                                                  

Administered Medications:                                                                         

17:39 Drug: Ibuprofen 800 mg Route: PO;                                                       kc6 

17:54 Follow up: Response: No adverse reaction; Pain is decreased                             kc6 

17:39 Drug: Hydrocodone-Acetaminophen (7.5 mg-325 mg) 1 tabs Route: PO;                       kc6 

17:54 Follow up: Response: No adverse reaction; Pain is decreased                             kc6 

                                                                                                  

                                                                                                  

Outcome:                                                                                          

17:45 Discharge ordered by MD.                                                                cp  

17:56 Patient left the ED.                                                                    kc6 

                                                                                                  

Signatures:                                                                                       

Dispatcher [employer]                           EDMS                                                 

Roberto Mcallister PA PA cp Garcia, Rubi                                 rg4                                                  

Asuncion Bartholomew RN                   RN   kc6                                                  

Cristy Medeiros RN                    RN   kb3                                                  

                                                                                                  

**************************************************************************************************

## 2022-12-16 NOTE — EDPHYS
Physician Documentation                                                                           

 [facility]                                                                 

Name: Dereck Dc                                                                         

Age: 67 yrs                                                                                       

Sex: Male                                                                                         

: 1955                                                                                   

MRN: [id number]                                                                                   

Arrival Date: 2022                                                                          

Time: 15:44                                                                                       

Account#: [id number]                                                                            

Bed 20                                                                                            

Private MD: YOLA Albert C                                                                            

ED Physician Jass Gaviria                                                                     

HPI:                                                                                              

                                                                                             

16:20 This 67 yrs old  Male presents to ER via Ambulatory with complaints of Leg      cp  

      Injury.                                                                                     

16:20 The patient presents with an injury, pain, that is acute. The complaints affect the     cp  

      right calf. Patient reports he was descending ladder and about 3 feet off ground,           

      ladder gave way causing him to fall to ground. Patient only complains of pain to back       

      of right lower leg.                                                                         

                                                                                                  

Historical:                                                                                       

- Allergies:                                                                                      

15:52 No Known Allergies;                                                                     kb3 

- PMHx:                                                                                           

15:52 Hypercholesterolemia; Hypertensive disorder;                                            kb3 

- PSHx:                                                                                           

15:52 Bilateral knee replacement;                                                             kb3 

                                                                                                  

- Immunization history:: Adult Immunizations up to date, Client reports receiving the             

  2nd dose of the Covid vaccine.                                                                  

- Social history:: Smoking status: Patient denies any tobacco usage or history of.                

                                                                                                  

                                                                                                  

ROS:                                                                                              

16:25 Constitutional: Negative for body aches, chills, fever, poor PO intake.                 cp  

16:25 Neck: Negative for pain with movement, pain at rest, stiffness.                         cp  

16:25 Cardiovascular: Negative for chest pain, palpitations.                                      

16:25 Respiratory: Negative for cough, shortness of breath, wheezing.                             

16:25 Abdomen/GI: Negative for abdominal pain, nausea, vomiting, and diarrhea.                    

16:25 Back: Negative for pain at rest, pain with movement.                                        

16:25 MS/extremity: Positive for pain, of the right calf, Negative for decreased range of         

      motion, deformity, paresthesias.                                                            

16:25 Neuro: Negative for altered mental status, headache, loss of consciousness, syncope,        

      weakness.                                                                                   

16:25 All other systems are negative.                                                             

                                                                                                  

Exam:                                                                                             

16:30 Constitutional: The patient appears in no acute distress, alert, awake,                 cp  

      non-diaphoretic, non-toxic, well developed, well nourished.                                 

16:30 Head/Face:  Normocephalic, atraumatic.                                                  cp  

16:30 Neck: ROM/movement: is normal, is supple, without pain, no range of motions limitations.    

16:30 Chest/axilla: Inspection: normal, Palpation: is normal, no crepitus, no tenderness.         

16:30 Cardiovascular: Rate: normal.                                                               

16:30 Respiratory: the patient does not display signs of respiratory distress,  Respirations:     

      normal, no use of accessory muscles, no retractions.                                        

16:30 Abdomen/GI: Inspection: abdomen appears normal, Palpation: abdomen is soft and              

      non-tender, in all quadrants.                                                               

16:30 Back: pain, is absent, ROM is normal.                                                       

16:30 Musculoskeletal/extremity: Extremities: noted in the right lower calf: pain,                

      tenderness, mild swelling, There is no evidence of  right Achilles tendon rupture, ROM:     

      full active range of motion, in the right ankle, Pulses: noted to be 2+ in the right        

      dorsalis pedis artery, the  right lower leg Sensation intact.                               

16:30 Neuro: Orientation: to person, place \T\ time. Mentation: is normal, Motor: moves all       

      fours, strength is normal.                                                                  

                                                                                                  

Vital Signs:                                                                                      

15:49 Weight 83.91 kg; Height 5 ft. 6 in. (167.64 cm); Pain 7/10;                             kb3 

16:22  / 85; Pulse 88; Resp 18 S; Pulse Ox 98% on R/A; Pain 7/10;                       kc6 

17:16  / 77; Pulse 90; Resp 18 S; Pulse Ox 98% on R/A;                                  kc6 

17:53  / 76; Pulse 85; Resp 15 S; Pulse Ox 97% on R/A; Pain 6/10;                       kc6 

15:49 Body Mass Index 29.86 (83.91 kg, 167.64 cm)                                             kb3 

                                                                                                  

MDM:                                                                                              

15:58 Patient medically screened.                                                             cp  

17:00 Differential diagnosis: dislocation, open fracture, closed fracture, Achilles tendon    cp  

      rupture.                                                                                    

17:44 Data reviewed: vital signs, nurses notes, radiologic studies, plain films.              cp  

17:44 Counseling: I had a detailed discussion with the patient and/or guardian regarding: the cp  

      historical points, exam findings, and any diagnostic results supporting the                 

      discharge/admit diagnosis, radiology results, to return to the emergency department if      

      symptoms worsen or persist or if there are any questions or concerns that arise at          

      home. Response to treatment: the patient's symptoms have markedly improved after            

      treatment, and as a result, I will discharge patient.                                       

                                                                                                  

                                                                                             

16:14 Order name: XRAY Tib Fib RIGHT; Complete Time: 17:30                                    cp  

                                                                                             

17:31 Order name: Ace Wrap; Complete Time: 17:39                                              cp  

                                                                                                  

Administered Medications:                                                                         

17:39 Drug: Ibuprofen 800 mg Route: PO;                                                       kc6 

17:54 Follow up: Response: No adverse reaction; Pain is decreased                             kc6 

17:39 Drug: Hydrocodone-Acetaminophen (7.5 mg-325 mg) 1 tabs Route: PO;                       kc6 

17:54 Follow up: Response: No adverse reaction; Pain is decreased                             kc6 

                                                                                                  

                                                                                                  

Disposition Summary:                                                                              

22 17:45                                                                                    

Discharge Ordered                                                                                 

      Location: Home                                                                          cp  

      Problem: new                                                                            cp  

      Symptoms: have improved                                                                 cp  

      Condition: Stable                                                                       cp  

      Diagnosis                                                                                   

        - Pain in right lower leg                                                             cp  

      Followup:                                                                               cp  

        - With: Private Physician                                                                  

        - When: 1 week                                                                             

        - Reason: Recheck today's complaints                                                       

      Discharge Instructions:                                                                     

        - Discharge Summary Sheet                                                             cp  

        - Muscle Strain                                                                       cp  

        - RICE Therapy for Routine Care of Injuries                                           cp  

      Forms:                                                                                      

        - Medication Reconciliation Form                                                      cp  

        - Thank You Letter                                                                    cp  

        - Antibiotic Education                                                                cp  

        - Prescription Opioid Use                                                             cp  

      Prescriptions:                                                                              

        - Diclofenac Sodium 75 mg Oral Tablet Sustained Release                                    

            - take 1 tablet by ORAL route 2 times per day; 30 tablet; Refills: 0, Product     cp  

      Selection Permitted                                                                         

Signatures:                                                                                       

Dispatcher [employer]                           EDRoberto Park PA PA   cp                                                   

Asuncion Bartholomew RN                   RN   kc6                                                  

Cristy Medeiros, RN                    RN   kb3                                                  

                                                                                                  

**************************************************************************************************

## 2022-12-16 NOTE — XMS REPORT
Continuity of Care Document

                          Created on:2022



Patient:JEREMIAH NAYAK

Sex:Male

:1955

External Reference #:[id number]





Demographics







                          Address                   [address] [place] [address]

 

                          Home Phone                [phone]

 

                          Work Phone                [phone]

 

                          Email Address             [email or web address]

 

                          Preferred Language        English

 

                          Marital Status            Unknown

 

                          Hindu Affiliation     Unknown

 

                          Race                      Unknown

 

                          Additional Race(s)        Unavailable



                                                    Unavailable

 

                          Ethnic Group              Unknown









Author







                          Organization              [facility]

t

 

                          Address                   [address]



                                                    [place] [address]

 

                          Phone                     [phone]









Support







                Name            Relationship    Address         Phone

 

                YEFRI NAYAK Unavailable     [address] [place] [phone]

[phone]



                                                [place] [address] 

 

                YEFRI NAYAK Unavailable     [address] [address] [place] [phone]

[phone]



                                                [place] [address] 

 

                JEREMIAH NAYAK Unavailable     .               [phone]

9









Care Team Providers







                    Name                Role                Phone

 

                    Seb          Attending Clinician Unavailable

 

                    Casey Gorman   Attending Clinician [phone]

 

                    Joe Holly     Attending Clinician Unavailable

 

                    Seb          Admitting Clinician Unavailable

 

                    Physician, No Primary or Family Admitting Clinician Unavaila

ble









Payers







           Payer Name Policy Type Policy Number Effective Date Expiration Date JESÚS CRUZ (MEDICARE            [id number] 2020            



           REPLACEMENT PPO)                       00:00:00              







Problems







       Condition Condition Condition Status Onset  Resolution Last   Treating Co

mments 

Source



       Name   Details Category        Date   Date   Treatment Clinician        



                                                 Date                 

 

       Hypertensi Hypertensi Problem Active 2016                             [facility]

[place]



       ve     ve                   2-27                               [facility]



       disorder Disorder               00:00:                             [facility]

y



                                   00                                 

 

       Raised Raised Problem Active 2016                             [facility]



       prostate Prostate               2-27                               [facility]



       specific Specific               00:00:                             [facility]

y



       antigen Antigen               00                                 

 

       Lower  Lower  Problem Active 2016                             [facility]



       urinary Urinary               2-27                               [facility]



       tract  Tract                00:00:                             [facility]



       symptoms Symptoms               00                                 



       due to Due to                                                  



       benign Benign                                                  



       prostatic Prostatic                                                  



       hypertroph Hypertroph                                                  



       y      y                                                       







Allergies, Adverse Reactions, Alerts







       Allergy Allergy Status Severity Reaction(s) Onset  Inactive Treating Comm

ents 

Source



       Name   Type                        Date   Date   Clinician        

 

       No Known DA     Active U             2018                      [facility]



       Allergie                             0-30                        [facility]



       s                                  00:00:                      [facility]



                                          00                          dic



                                                                      [facility]



                                                                      l

 

       No Known DA     Active U             2018                      [facility]



       Drug                               0-30                        [facility]



       Allergie                             00:00:                      [facility]



       s                                  00                          dic



                                                                      [facility]osp[facility]



                                                                      l

 

       pollen DA     Active SV     SNEEZING, -                      [facility]



       extracts                      WATERY EYES 0-30                        [facility]

as



                                          00:00:                      [facility]



                                          00                          dic



                                                                      [facility]osp[facility]



                                                                      l

 

       No Known DA     Active U             2018                      [facility]



       Drug                               0-30                        [facility]



       Allergie                             00:00:                      [place]



       s                                  00                          [facility]

 

       pollen DA     Active SV            2018                      [facility]



       extracts                             0-30                        [facility]



                                          00:00:                      [place]



                                          00                          [facility]







Social History







                Smoking Status  Start Date      Stop Date       Source

 

                Never Smoker                                    [facility]

ology







Medications







       Ordered Filled Start  Stop   Current Ordering Indication Dosage Frequency

 Signature

                    Comments            Components          Source



     Medication Medication Date Date Medication? Clinician                (SIG) 

          



     Name Name                                                   

 

     alendronate alendronate           No                       alendronat      

     [facility]



     70 mg 70 mg                                    e 70 mg           [facility]



     tablet TAKE tablet TAKE                                    tablet          

 [facility]



     1 TABLET BY 1 TABLET BY                                    TAKE 1          

 



     MOUTH ONE MOUTH ONE                                    TABLET BY           



     TIME PER TIME PER                                    MOUTH ONE           



     WEEK WEEK                                    TIME PER           



                                                  WEEK           

 

     amoxicillin amoxicillin           No                       amoxicilli      

     [facility]



     500 mg 500 mg                                    n 500 mg           [facility]



     capsule capsule                                    capsule           [facility]

y



     TAKE 1 TAKE 1                                    TAKE 1           



     CAPSULE BY CAPSULE BY                                    CAPSULE BY        

   



     MOUTH 3 MOUTH 3                                    MOUTH 3           



     TIMES A DAY TIMES A DAY                                    TIMES A         

  



     FOR 3 DAYS. FOR 3 DAYS.                                    DAY FOR 3       

    



     START A DAY START A DAY                                    DAYS.           



     PRIOR TO PRIOR TO                                    START A           



     DENTAL DENTAL                                    DAY PRIOR           



     APPOINTMENT APPOINTMENT                                    TO DENTAL       

    



                                                  APPOINTMEN           



                                                  T              

 

     amoxicillin amoxicillin           No                       amoxicilli      

     [facility]



     500  500                                     n 500           [facility]



     mg-potassiu mg-potassiu                                    mg-potassi      

     [facility]



     m    m                                       um             



     clavulanate clavulanate                                    clavulanat      

     



     125 mg 125 mg                                    e 125 mg           



     tablet TAKE tablet TAKE                                    tablet          

 



     ONE TABLET ONE TABLET                                    TAKE ONE          

 



     BY MOUTH BY MOUTH                                    TABLET BY           



     EVERY EVERY                                    MOUTH           



     TWELVE TWELVE                                    EVERY           



     HOURS UNTIL HOURS UNTIL                                    TWELVE          

 



     GONE GONE                                    HOURS           



                                                  UNTIL GONE           

 

     amoxicillin amoxicillin           No                       amoxicilli      

     [facility]



     875  875                                     n 875           [facility]



     mg-potassiu mg-potassiu                                    mg-potassi      

     [facility]



     m    m                                       um             



     clavulanate clavulanate                                    clavulanat      

     



     125 mg 125 mg                                    e 125 mg           



     tablet TAKE tablet TAKE                                    tablet          

 



     1 TABLET BY 1 TABLET BY                                    TAKE 1          

 



     MOUTH EVERY MOUTH EVERY                                    TABLET BY       

    



     12 HOURS 12 HOURS                                    MOUTH           



                                                  EVERY 12           



                                                  HOURS           

 

     aspirin 81 aspirin 81           No                       aspirin 81        

   [facility]



     mg   mg                                      mg             [facility]



     tablet,dalia tablet,dalia                                    tablet,del      

     [facility]



     yed release yed release                                    ayed           



                                                  release           

 

     atorvastati atorvastati           No                       atorvastat      

     [facility]



     n 20 mg n 20 mg                                    in 20 mg           [facility]



     tablet TAKE tablet TAKE                                    tablet          

 [facility]



     1 TABLET BY 1 TABLET BY                                    TAKE 1          

 



     MOUTH EVERY MOUTH EVERY                                    TABLET BY       

    



     DAY  DAY                                     MOUTH           



                                                  EVERY DAY           

 

     betamethaso betamethaso           No                       betamethas      

     [facility]



     ne   ne                                      one            [facility]



     dipropionat dipropionat                                    dipropiona      

     [facility]



     e 0.05 % e 0.05 %                                    te 0.05 %           



     topical topical                                    topical           



     cream APPLY cream APPLY                                    cream           



     TO RASH ON TO RASH ON                                    APPLY TO          

 



     LEGS TWICE LEGS TWICE                                    RASH ON           



     A DAY FOR 2 A DAY FOR 2                                    LEGS TWICE      

     



     WEEKS/MONTH WEEKS/MONTH                                    A DAY FOR       

    



     .    .                                       2              



                                                  WEEKS/DAMIAN           



                                                  H.             

 

     Bystolic 5 Bystolic 5           No                       Bystolic 5        

   [facility]



     mg tablet mg tablet                                    mg tablet           

[facility]



                                                                 [facility]

 

     chlorhexidi chlorhexidi           No                       chlorhexid      

     [facility]



     ne   ne                                      ine            [facility]



     gluconate gluconate                                    gluconate           

[facility]



     0.12 % 0.12 %                                    0.12 %           



     mouthwash mouthwash                                    mouthwash           



     TAKE 15 ML TAKE 15 ML                                    TAKE 15 ML        

   



     BY MOUTH BY MOUTH                                    BY MOUTH           



     EVERY EVERY                                    EVERY           



     TWELVE TWELVE                                    TWELVE           



     HOURS. HOURS.                                    HOURS.           



     SWISH FOR SWISH FOR                                    SWISH FOR           



     TWO MINUTES TWO MINUTES                                    TWO            



     AND SPIT. AND SPIT.                                    MINUTES           



     DO NOT DO NOT                                    AND SPIT.           



     SWALLOW. SWALLOW.                                    DO NOT           



                                                  SWALLOW.           

 

     Clenpiq 10 Clenpiq 10           No                       Clenpiq 10        

   [facility]



     mg-3.5 mg-3.5                                    mg-3.5           [facility]



     gram-12 gram-12                                    gram-12           [facility]

y



     gram/160 mL gram/160 mL                                    gram/160        

   



     oral oral                                    mL oral           



     solution solution                                    solution           



     TAKE AS TAKE AS                                    TAKE AS           



     DIRECTED DIRECTED                                    DIRECTED           

 

     diclofenac diclofenac           No                       diclofenac        

   [facility]



     1 % topical 1 % topical                                    1 %            M

[facility]



     gel APPLY 1 gel APPLY 1                                    topical         

  [facility]



     GRAM TO GRAM TO                                    gel APPLY           



     AFFECTED AFFECTED                                    1 GRAM TO           



     AREA TWICE AREA TWICE                                    AFFECTED          

 



     A DAY AS A DAY AS                                    AREA TWICE           



     NEEDED CAN NEEDED CAN                                    A DAY AS          

 



     APPLY UP TO APPLY UP TO                                    NEEDED CAN      

     



     2 GRAMS IF 2 GRAMS IF                                    APPLY UP          

 



     NEEDED NEEDED                                    TO 2 GRAMS           



                                                  IF NEEDED           

 

     lisinopril lisinopril           No                       lisinopril        

   [facility]



     10 mg 10 mg                                    10 mg           [facility]



     tablet TAKE tablet TAKE                                    tablet          

 [facility]



     1 TABLET BY 1 TABLET BY                                    TAKE 1          

 



     MOUTH EVERY MOUTH EVERY                                    TABLET BY       

    



     DAY  DAY                                     MOUTH           



                                                  EVERY DAY           

 

     meloxicam meloxicam           No                       meloxicam           

[facility]



     15 mg 15 mg                                    15 mg           [facility]



     tablet TAKE tablet TAKE                                    tablet          

 [facility]



     1 TABLET BY 1 TABLET BY                                    TAKE 1          

 



     MOUTH EVERY MOUTH EVERY                                    TABLET BY       

    



     DAY WITH A DAY WITH A                                    MOUTH           



     MEAL MEAL                                    EVERY DAY           



                                                  WITH A           



                                                  MEAL           

 

     metoprolol metoprolol           No                       metoprolol        

   [facility]



     succinate succinate                                    succinate           

[facility]



     ER 25 mg ER 25 mg                                    ER 25 mg           [facility]

[facility]



     tablet,exte tablet,exte                                    tablet,ext      

     



     nded nded                                    ended           



     release 24 release 24                                    release 24        

   



     hr TAKE 1 hr TAKE 1                                    hr TAKE 1           



     TABLET BY TABLET BY                                    TABLET BY           



     MOUTH MOUTH                                    MOUTH           



     EVERYDAY AT EVERYDAY AT                                    EVERYDAY        

   



     BEDTIME BEDTIME                                    AT BEDTIME           

 

     prednisone prednisone           No                       prednisone        

   [facility]



     10 mg 10 mg                                    10 mg           [facility]



     tablet tablet                                    tablet           [facility]



     PLEASE SEE PLEASE SEE                                    PLEASE SEE        

   



     ATTACHED ATTACHED                                    ATTACHED           



     FOR  FOR                                     FOR            



     DETAILED DETAILED                                    DETAILED           



     DIRECTIONS DIRECTIONS                                    DIRECTIONS        

   

 

     tramadol 50 tramadol 50           No                       tramadol        

   [facility]



     mg tablet mg tablet                                    50 mg           [facility]

o



     TAKE 1 TAKE 1                                    tablet           [facility]



     TABLET BY TABLET BY                                    TAKE 1           



     MOUTH EVERY MOUTH EVERY                                    TABLET BY       

    



     4 TO 6 4 TO 6                                    MOUTH           



     HOURS HOURS                                    EVERY 4 TO           



                                                  6 HOURS           

 

     alendronate alendronate           No                       alendronat      

     [facility]



     70 mg 70 mg                                    e 70 mg           [facility]



     tablet TAKE tablet TAKE                                    tablet          

 [facility]



     1 TABLET BY 1 TABLET BY                                    TAKE 1          

 



     MOUTH ONE MOUTH ONE                                    TABLET BY           



     TIME PER TIME PER                                    MOUTH ONE           



     WEEK WEEK                                    TIME PER           



                                                  WEEK           

 

     amoxicillin amoxicillin           No                       amoxicilli      

     [facility]



     500 mg 500 mg                                    n 500 mg           [facility]



     capsule capsule                                    capsule           [facility]

y



     TAKE 1 TAKE 1                                    TAKE 1           



     CAPSULE BY CAPSULE BY                                    CAPSULE BY        

   



     MOUTH THREE MOUTH THREE                                    MOUTH           



     TIMES A DAY TIMES A DAY                                    THREE           



     X 3 DAYS X 3 DAYS                                    TIMES A           



     START A DAY START A DAY                                    DAY X 3         

  



     PRIOR TO PRIOR TO                                    DAYS START           



     DENTAL DENTAL                                    A DAY           



     APPOINTMENT APPOINTMENT                                    PRIOR TO        

   



                                                  DENTAL           



                                                  APPOINTMEN           



                                                  T              

 

     aspirin 81 aspirin 81           No                       aspirin 81        

   [facility]



     mg   mg                                      mg             [facility]



     tablet,dalia tablet,dalia                                    tablet,del      

     [facility]



     yed release yed release                                    ayed           



                                                  release           

 

     atorvastati atorvastati           No                       atorvastat      

     [facility]



     n 20 mg n 20 mg                                    in 20 mg           [facility]



     tablet TAKE tablet TAKE                                    tablet          

 [facility]



     1 TABLET BY 1 TABLET BY                                    TAKE 1          

 



     MOUTH EVERY MOUTH EVERY                                    TABLET BY       

    



     DAY  DAY                                     MOUTH           



                                                  EVERY DAY           

 

     Bystolic 5 Bystolic 5           No                       Bystolic 5        

   [facility]



     mg tablet mg tablet                                    mg tablet           

[facility]



                                                                 [facility]

 

     lisinopril lisinopril           No                       lisinopril        

   [facility]



     10 mg 10 mg                                    10 mg           [facility]



     tablet TAKE tablet TAKE                                    tablet          

 [facility]



     1 TABLET BY 1 TABLET BY                                    TAKE 1          

 



     MOUTH EVERY MOUTH EVERY                                    TABLET BY       

    



     DAY  DAY                                     MOUTH           



                                                  EVERY DAY           

 

     metoprolol metoprolol           No                       metoprolol        

   [facility]



     succinate succinate                                    succinate           

[facility]



     ER 25 mg ER 25 mg                                    ER 25 mg           [facility]

[facility]



     tablet,exte tablet,exte                                    tablet,ext      

     



     nded nded                                    ended           



     release 24 release 24                                    release 24        

   



     hr TAKE 1 hr TAKE 1                                    hr TAKE 1           



     TABLET BY TABLET BY                                    TABLET BY           



     MOUTH MOUTH                                    MOUTH           



     EVERYDAY AT EVERYDAY AT                                    EVERYDAY        

   



     BEDTIME BEDTIME                                    AT BEDTIME           

 

     tramadol 50 tramadol 50           No                       tramadol        

   [facility]



     mg tablet mg tablet                                    50 mg           [facility]

[facility]



     TAKE 1 TAKE 1                                    tablet           [facility]



     TABLET BY TABLET BY                                    TAKE 1           



     MOUTH EVERY MOUTH EVERY                                    TABLET BY       

    



     4 TO 6 4 TO 6                                    MOUTH           



     HOURS HOURS                                    EVERY 4 TO           



                                                  6 HOURS           

 

     alendronate alendronate           No                       alendronat      

     [facility]



     70 mg 70 mg                                    e 70 mg           [facility]



     tablet TAKE tablet TAKE                                    tablet          

 [facility]



     1 TABLET BY 1 TABLET BY                                    TAKE 1          

 



     MOUTH ONE MOUTH ONE                                    TABLET BY           



     TIME PER TIME PER                                    MOUTH ONE           



     WEEK WEEK                                    TIME PER           



                                                  WEEK           

 

     amoxicillin amoxicillin           No                       amoxicilli      

     [facility]



     500 mg 500 mg                                    n 500 mg           [facility]



     capsule capsule                                    capsule           [facility]

y



     TAKE 1 TAKE 1                                    TAKE 1           



     CAPSULE BY CAPSULE BY                                    CAPSULE BY        

   



     MOUTH THREE MOUTH THREE                                    MOUTH           



     TIMES A DAY TIMES A DAY                                    THREE           



     X 3 DAYS X 3 DAYS                                    TIMES A           



     START A DAY START A DAY                                    DAY X 3         

  



     PRIOR TO PRIOR TO                                    DAYS START           



     DENTAL DENTAL                                    A DAY           



     APPOINTMENT APPOINTMENT                                    PRIOR TO        

   



                                                  DENTAL           



                                                  APPOINTMEN           



                                                  T              

 

     aspirin 81 aspirin 81           No                       aspirin 81        

   [facility]



     mg   mg                                      mg             [facility]



     tablet,dalia tablet,dalia                                    tablet,del      

     [facility]



     yed release yed release                                    ayed           



                                                  release           

 

     atorvastati atorvastati           No                       atorvastat      

     [facility]



     n 20 mg n 20 mg                                    in 20 mg           [facility]



     tablet TAKE tablet TAKE                                    tablet          

 [facility]



     1 TABLET BY 1 TABLET BY                                    TAKE 1          

 



     MOUTH EVERY MOUTH EVERY                                    TABLET BY       

    



     DAY  DAY                                     MOUTH           



                                                  EVERY DAY           

 

     Bystolic 5 Bystolic 5           No                       Bystolic 5        

   [facility]



     mg tablet mg tablet                                    mg tablet           

[facility]



                                                                 [facility]

 

     lisinopril lisinopril           No                       lisinopril        

   [facility]



     10 mg 10 mg                                    10 mg           [facility]



     tablet TAKE tablet TAKE                                    tablet          

 [facility]



     1 TABLET BY 1 TABLET BY                                    TAKE 1          

 



     MOUTH EVERY MOUTH EVERY                                    TABLET BY       

    



     DAY  DAY                                     MOUTH           



                                                  EVERY DAY           

 

     metoprolol metoprolol           No                       metoprolol        

   [facility]



     succinate succinate                                    succinate           

[facility]



     ER 25 mg ER 25 mg                                    ER 25 mg           [facility]

[facility]



     tablet,exte tablet,exte                                    tablet,ext      

     



     nded nded                                    ended           



     release 24 release 24                                    release 24        

   



     hr TAKE 1 hr TAKE 1                                    hr TAKE 1           



     TABLET BY TABLET BY                                    TABLET BY           



     MOUTH MOUTH                                    MOUTH           



     EVERYDAY AT EVERYDAY AT                                    EVERYDAY        

   



     BEDTIME BEDTIME                                    AT BEDTIME           

 

     tramadol 50 tramadol 50           No                       tramadol        

   [facility]



     mg tablet mg tablet                                    50 mg           [facility]

o



     TAKE 1 TAKE 1                                    tablet           [facility]



     TABLET BY TABLET BY                                    TAKE 1           



     MOUTH EVERY MOUTH EVERY                                    TABLET BY       

    



     4 TO 6 4 TO 6                                    MOUTH           



     HOURS HOURS                                    EVERY 4 TO           



                                                  6 HOURS           

 

     alendronate alendronate           No                       alendronat      

     [facility]



     70 mg 70 mg                                    e 70 mg           [facility]



     tablet TAKE tablet TAKE                                    tablet          

 [facility]



     1 TABLET BY 1 TABLET BY                                    TAKE 1          

 



     MOUTH ONCE MOUTH ONCE                                    TABLET BY         

  



     A WEEK A WEEK                                    MOUTH ONCE           



                                                  A WEEK           

 

     amoxicillin amoxicillin           No                       amoxicilli      

     [facility]



     500 mg 500 mg                                    n 500 mg           [facility]



     capsule capsule                                    capsule           [facility]

y



     TAKE 1 TAKE 1                                    TAKE 1           



     CAPSULE BY CAPSULE BY                                    CAPSULE BY        

   



     MOUTH 3 MOUTH 3                                    MOUTH 3           



     TIMES A DAY TIMES A DAY                                    TIMES A         

  



     FOR 3 DAYS. FOR 3 DAYS.                                    DAY FOR 3       

    



     START A DAY START A DAY                                    DAYS.           



     PRIOR TO PRIOR TO                                    START A           



     DENTAL DENTAL                                    DAY PRIOR           



     APPOINTMENT APPOINTMENT                                    TO DENTAL       

    



                                                  APPOINTMEN           



                                                  T              

 

     aspirin 81 aspirin 81           No                       aspirin 81        

   [facility]



     mg   mg                                      mg             [facility]



     tablet,dalia tablet,dalia                                    tablet,del      

     [facility]



     yed release yed release                                    ayed           



                                                  release           

 

     atorvastati atorvastati           No                       atorvastat      

     [facility]



     n 20 mg n 20 mg                                    in 20 mg           [facility]



     tablet TAKE tablet TAKE                                    tablet          

 [facility]



     1 TABLET BY 1 TABLET BY                                    TAKE 1          

 



     MOUTH EVERY MOUTH EVERY                                    TABLET BY       

    



     DAY  DAY                                     MOUTH           



                                                  EVERY DAY           

 

     betamethaso betamethaso           No                       betamethas      

     [facility]



     ne   ne                                      one            [facility]



     dipropionat dipropionat                                    dipropiona      

     [facility]



     e 0.05 % e 0.05 %                                    te 0.05 %           



     topical topical                                    topical           



     cream APPLY cream APPLY                                    cream           



     TO RASH ON TO RASH ON                                    APPLY TO          

 



     LEGS TWICE LEGS TWICE                                    RASH ON           



     A DAY FOR 2 A DAY FOR 2                                    LEGS TWICE      

     



     WEEKS/MONTH WEEKS/MONTH                                    A DAY FOR       

    



     .    .                                       2              



                                                  WEEKS/DAMIAN           



                                                  H.             

 

     Bystolic 5 Bystolic 5           No                       Bystolic 5        

   [facility]



     mg tablet mg tablet                                    mg tablet           

[facility]



                                                                 [facility]

 

     Clenpiq 10 Clenpiq 10           No                       Clenpiq 10        

   [facility]



     mg-3.5 mg-3.5                                    mg-3.5           [facility]



     gram-12 gram-12                                    gram-12           [facility]

y



     gram/160 mL gram/160 mL                                    gram/160        

   



     oral oral                                    mL oral           



     solution solution                                    solution           



     TAKE AS TAKE AS                                    TAKE AS           



     DIRECTED DIRECTED                                    DIRECTED           

 

     diclofenac diclofenac           No                       diclofenac        

   [facility]



     1 % topical 1 % topical                                    1 %            [facility]

[facility]



     gel APPLY 1 gel APPLY 1                                    topical         

  [facility]



     GRAM TO GRAM TO                                    gel APPLY           



     AFFECTED AFFECTED                                    1 GRAM TO           



     AREA TWICE AREA TWICE                                    AFFECTED          

 



     A DAY AS A DAY AS                                    AREA TWICE           



     NEEDED CAN NEEDED CAN                                    A DAY AS          

 



     APPLY UP TO APPLY UP TO                                    NEEDED CAN      

     



     2 GRAMS IF 2 GRAMS IF                                    APPLY UP          

 



     NEEDED NEEDED                                    TO 2 GRAMS           



                                                  IF NEEDED           

 

     lisinopril lisinopril           No                       lisinopril        

   [facility]



     10 mg 10 mg                                    10 mg           [facility]



     tablet TAKE tablet TAKE                                    tablet          

 [facility]



     1 TABLET BY 1 TABLET BY                                    TAKE 1          

 



     MOUTH EVERY MOUTH EVERY                                    TABLET BY       

    



     DAY  DAY                                     MOUTH           



                                                  EVERY DAY           

 

     meloxicam meloxicam           No                       meloxicam           

[facility]



     15 mg 15 mg                                    15 mg           [facility]



     tablet TAKE tablet TAKE                                    tablet          

 [facility]



     1 TABLET BY 1 TABLET BY                                    TAKE 1          

 



     MOUTH EVERY MOUTH EVERY                                    TABLET BY       

    



     DAY WITH A DAY WITH A                                    MOUTH           



     MEAL MEAL                                    EVERY DAY           



                                                  WITH A           



                                                  MEAL           

 

     metoprolol metoprolol           No                       metoprolol        

   [facility]



     succinate succinate                                    succinate           

[facility]



     ER 25 mg ER 25 mg                                    ER 25 mg           [facility]

log[facility]



     tablet,exte tablet,exte                                    tablet,ext      

     



     nded nded                                    ended           



     release 24 release 24                                    release 24        

   



     hr TAKE 1 hr TAKE 1                                    hr TAKE 1           



     TABLET BY TABLET BY                                    TABLET BY           



     MOUTH MOUTH                                    MOUTH           



     EVERYDAY AT EVERYDAY AT                                    EVERYDAY        

   



     BEDTIME BEDTIME                                    AT BEDTIME           

 

     prednisone prednisone           No                       prednisone        

   [facility]



     10 mg 10 mg                                    10 mg           [facility]



     tablet TAKE tablet TAKE                                    tablet          

 [facility]



     1 TABLET BY 1 TABLET BY                                    TAKE 1          

 



     MOUTH THREE MOUTH THREE                                    TABLET BY       

    



     TIMES A DAY TIMES A DAY                                    MOUTH           



                                                  THREE           



                                                  TIMES A           



                                                  DAY            

 

     tramadol 50 tramadol 50           No                       tramadol        

   [facility]



     mg tablet mg tablet                                    50 mg           [facility]

o



     TAKE 1 TAKE 1                                    tablet           [facility]



     TABLET BY TABLET BY                                    TAKE 1           



     MOUTH EVERY MOUTH EVERY                                    TABLET BY       

    



     4 TO 6 4 TO 6                                    MOUTH           



     HOURS HOURS                                    EVERY 4 TO           



                                                  6 HOURS           







Immunizations







           Ordered Immunization Filled Immunization Date       Status     Commen

   Source



           Name       Name                                        

 

           influenza, influenza, 2020-10-01 Completed             [facility]



           injectable, injectable, 00:00:00                         [facility]



           quadrivalent quadrivalent                                  

 

           influenza, influenza, 2020-10-01 Completed             [facility]



           injectable, injectable, 00:00:00                         [facility]



           quadrivalent quadrivalent                                  

 

           influenza, influenza, 2020-10-01 Completed             [facility]



           injectable, injectable, 00:00:00                         [facility]



           quadrivalent quadrivalent                                  

 

           influenza, influenza, 2020-10-01 Completed             [facility]



           injectable, injectable, 00:00:00                         [facility]



           quadrivalent quadrivalent                                  

 

           influenza, influenza, 2019 Completed             [facility]



           injectable, injectable, 00:00:00                         [facility]



           quadrivalent quadrivalent                                  

 

           influenza, influenza, 2019 Completed             [facility]



           injectable, injectable, 00:00:00                         [facility]



           quadrivalent quadrivalent                                  

 

           influenza, influenza, 2019 Completed             [facility]



           injectable, injectable, 00:00:00                         [facility]



           quadrivalent quadrivalent                                  

 

           influenza, influenza, 2019 Completed             [facility]



           injectable, injectable, 00:00:00                         [facility]



           quadrivalent quadrivalent                                  

 

           pneumococcal pneumococcal 2018 Completed             [facility]

tro



           polysaccharide PPV23 polysaccharide PPV23 00:00:00                   

      Urology

 

           pneumococcal pneumococcal 2018 Completed             [facility]

tro



           polysaccharide PPV23 polysaccharide PPV23 00:00:00                   

      Urology

 

           pneumococcal pneumococcal 2018 Completed             [facility]

tro



           polysaccharide PPV23 polysaccharide PPV23 00:00:00                   

      Urology

 

           pneumococcal pneumococcal 2018 Completed             [facility]

tro



           polysaccharide PPV23 polysaccharide PPV23 00:00:00                   

      U[facility]







Vital Signs







             Vital Name   Observation Time Observation Value Comments     Source

 

             Body Weight  2022 00:00:00 180 [lb_av]               [facility] 

[facility]



                                                                 [facility]

 

             Height       2022 00:00:00 66 [in_i]                 [facility] 

[facility]



                                                                 [facility]

 

             BMI (Body Mass 2022 00:00:00 29.1 kg/m2                [facility]

n [facility]



             Index)                                              [facility]

 

             Height       2021 00:00:00 66 [in_i]                 [facility] 

[facility]



                                                                 [facility]

 

             BMI (Body Mass 2021 00:00:00 29.1 kg/m2                [facility]

[facility]



             Index)                                              [facility]

 

             Body Weight  2021 00:00:00 180 [lb_av]               [facility] 

[facility]



                                                                 [facility]

 

             Height       2021 00:00:00 66 [in_i]                 [facility] 

[facility]



                                                                 [facility]

 

             BMI (Body Mass 2021 00:00:00 29.1 kg/m2                [facility]

n [facility]



             Index)                                              [facility]

 

             Body Weight  2021 00:00:00 180 [lb_av]               [facility] 

[facility]



                                                                 [facility]







Procedures







                Procedure       Date / Time Performed Performing Clinician Taiwo

e

 

                Diagnostic Colonoscopy                                 [facility]



                                                                Urology







Plan of Care







             Planned Activity Planned Date Details      Comments     Source

 

             Diagnostic Test 2022-11-15   PSA, serum or              [facility]

ro



             Pending      00:00:00     plasma [code = PSA,              [facility]



                                       serum or plasma]              

 

             Diagnostic Test 2022-11-15   urinalysis,               [facility]

o



             Pending      00:00:00     dipstick [code =              [facility]



                                       urinalysis,               



                                       dipstick]                 

 

             [facility]ment 2023   [facility]



                          00:00:00     [id number] [facility]              Urology



                                       [address] , [place], [place]              



                                       [phone]                







Encounters







        Start   End     Encounter Admission Attending Care    Care    Encounter 

Source



        Date/Time Date/Time Type    Type    Clinicians Facility Department ID   

   

 

        2022 Outpatient         [facility]     U     [id number]

[id number] [place]



        00:00:00 00:00:00                                         [id number]   [facility]



                                                                        U[facility]

 

        2022 Outpatient         [facility]     U     [address]

 [place]



        00:00:00 00:00:00                                         [id number]   [facility]



                                                                        Urology

 

        2022-11-15 2022-11-15 Outpatient         [facility]     [facility]     [id number] [place]



        00:00:00 00:00:00                                         [id number]   [facility]



                                                                        Urology

 

        2022-11-15 2022-11-15 [place]                 [facility]     [place] -    [phone] [facility]



        00:00:00 00:00:00 Emiliano Gorman MD: [id number]                         [facility]           Urology



                        [address]                          [facility]         



                        [address]          



                        [address],                                           



                        [place], [place]                                              



                        [address]                                         



                        , Ph.                                           



                        [phone]                                         

 

        2022 Outpatient         [facility]     [facility]     [id number]

[address] [place]



        00:00:00 00:00:00                                         [id number]   [facility]



                                                                        Urology

 

        2022 Outpatient         [facility]     U     [id number] [place]



        10:18:00 10:18:00                                         [id number]   [facility]



                                                                        Urology

 

        2022 Outpatient         [facility]     U     [address]

 [place]



        11:48:00 11:48:00                                         [id number]   [facility]



                                                                        Urology

 

        2022 Outpatient         [facility], [facility]     U     [id number]

[id number] 



        00:00:00 00:00:00                 Casey                 [id number] 



                                                                [phone] 



                                                                [id number]  

 

        2022 Casey                 [facility]     [place] -    [phone] [facility] [facility]



        00:00:00 00:00:00 Emiliano Gorman MD: [id number]                         [facility]                          Urology PA         



                        [address]                           [address]          



                        [address],                                           



                        [place], [place]                                              



                        [address]                                         



                        , Ph.                                           



                        [phone]                                         

 

        2021 Outpatient         [facility]     U     [address]

 [place]



        11:34:00 11:34:00                                         [id number]   [facility]



                                                                        Urology

 

        2021 Outpatient         [facility]     U     [address]

[address] [place]



        11:42:00 11:42:00                                         [id number]   [facility]



                                                                        Urology

 

        2021 Outpatient         Sherif, U     U     [id number]

[id number] 



        00:00:00 00:00:00                 Casey                 [id number] 



                                                                [id number] 



                                                                [id number]  

 

        2021 Casey                 [facility]     [place] -    [id number] H

[facility]



        00:00:00 00:00:00 Emiliano Gorman MD: [id number]                         [facility]           Urology



                        [place]                          Urology PA         



                        [address]                           [address]          



                        [address], [place]                                              



                        [address]                                         



                        , Ph.                                           



                        [phone]                                         

 

        2021 Outpatient         [facility]     [facility]     [address]

[id number] [place]



        05:25:00 05:25:00                                         [id number]   [facility]



                                                                        Urology

 

        2021 Outpatient         [facility]     U     [address]

[address] [place]



        01:37:00 01:37:00                                         [id number]   [facility]



                                                                        Urology

 

        2021 Outpatient         Sherif, U     [facility]     [id number]

[id number] 



        00:00:00 00:00:00                 Casey                 [id number] 



                                                                [id number] 



                                                                [id number]  

 

        2021 Casey                 [facility]     [place] -    [id number] H

[facility]



        00:00:00 00:00:00 Emiliano Gorman MD: [id number]                         [facility]           Urology



                        [facility]                          Urology PA         



                        [address]                           [address]          



                        [address],                                           



                        [place], [place]                                              



                        [address]                                         



                        , Ph.                                           



                        [phone]                                         

 

        2020 Outpatient         Avni,  HCATO   SURG    [id number]

091 [facility]



        11:45:00 11:45:00                 [address]



                                                                        [facility]



                                                                        Hospita



                                                                        l







Results







           Test Description Test Time  Test Comments Results    Result Comments 

Source









                    Urinalysis macro (dipstick) panel - Urine 2022-11-15 10:44:0

0 









                      Test Item  Value      Reference Range Interpretation Comme

nts









             leukocytes (test code = negative     neg                       



             leukocytes)                                         

 

             urobilinogen (test code = 0.2 E.U./dL  sm amt (.5-1mg/dL)          

    



             urobilinogen)                                        

 

             protein (test code = negative     See_Comment                [Autom

ated message] The



             protein)                                            system which ge

nerated



                                                                 this result tra

nsmitted



                                                                 reference range

: <=150



                                                                 mg/d. The refer

ence range



                                                                 was not used to

 interpret



                                                                 this result as



                                                                 normal/abnormal

.

 

             pH (test code = pH) 7.5          4.5-8                     

 

             blood (test code = blood) negative     See_Comment                [

Automated message] The



                                                                 system which ge

nerated



                                                                 this result tra

nsmitted



                                                                 reference range

: <=3 RBC.



                                                                 The reference r

[place] was



                                                                 not used to int

erpret



                                                                 this result as



                                                                 normal/abnormal

.

 

             specific gravity (test code 1.020        1.005-1.025               



             = specific gravity)                                        

 

             ketone (test code = ketone) negative     none                      

 

             bilirubin (test code = negative     neg                       



             bilirubin)                                          

 

             glucose (test code = negative     See_Comment                [Autom

ated message] The



             glucose)                                            system which ge

nerated



                                                                 this result tra

nsmitted



                                                                 reference range

: <=130



                                                                 mg/d. The refer

ence range



                                                                 was not used to

 interpret



                                                                 this result as



                                                                 normal/abnormal

.

 

             color (test code = color) yellow       yellow                    

 

             clarity (test code = clear        clear or cloudy              



             clarity)                                            

 

             nitrite (test code = negative     neg                       



             nitrite)                                            



[facility] Urology- XR KNEE 1 OR 2 V [id number] 15:14:00 Patient Name: 
JEREMIAH NAYAK Unit No: [id number] EXAMS: CPT CODE: [id number] XR KNEE 1 OR 
2 V LT  20968 LEFT KNEE 2 VIEWS PORTABLE COMMENT: The patient is status post 
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  ** Electronically Signed by 
Edmund Galaviz MD ** ** on 2019 at 1514 ** Reported and signed by: 
Edmund Galaviz MD  CC: Joe Holly MD  Technologist: ARIS BROWN (RT.R) 
Transcribed D/T: 2019 (4124) t.SDR.JCL [facility] NAME: 
JEREMIAH NAYAK [address] PHYS: Joe Sanchez MD : 
1955 AGE: 64 SEX: M [place], [place] [address] ACCT NO: [id number] LOC: Y.315 
A PHONE #: [phone] EXAM DATE: 2019 STATUS: DIS IN FAX #: [phone]
RAD #:  D/C DT 2019 PAGE 1 Signed Report Patient Name: JEREMIAH NAYAK
Unit No: [id number] EXAMS:  CPT CODE: [id number] XR KNEE 1 OR 2 V LT 44424 
(Continued) Orig Print D/T: S: 2019 (1517)  [facility] 
NAME: JEREMIAH NAYAK [address] [facility] [address]  PHYS: Joe Sanchez MD : 
1955 AGE: 64 SEX: M [place], [place] [address] ACCT NO: [id number] LOC: Y.315 
A  PHONE #: [phone] EXAM DATE: 2019 STATUS: DIS IN FAX #: 
[phone] RAD #: D/C DT 2019 PAGE 2 Signed ReportBASIC METABOLIC PANEL
2019 06:51:00





             Test Item    Value        Reference Range Interpretation Comments

 

             SODIUM (test code = 138 mmol/L   136-145      N            



             NA)                                                 

 

             POTASSIUM (test code = 4.7 mmol/L   3.5-5.1      N            



             K)                                                  

 

             CHLORIDE (test code = 101.0 mmol/L        N            



             CL)                                                 

 

             CARBON DIOXIDE (test 25.1 mmol/L  21-32        N            



             code = CO2)                                         

 

             GLUCOSE (test code = 110 mg/dL           N            



             GLU)                                                

 

             BLOOD UREA NITROGEN 15 mg/dL     7-18         N            



             (test code = BUN)                                        

 

             GLOMERULAR FILTRATION 79.3         >60                       Unit o

f measure:



             RATE (test code = GFR)                                        mL/mi

n/1.73



                                                                 [id number]



                                                                 Range:Healthy



                                                                 Adults >90



                                                                 mL/min/1.73 m2 

For



                                                                 Chronic Kidney



                                                                 Disease: Stage 

II



                                                                 Mild Decrease i

n



                                                                 GFR 60-90 Stage

 III



                                                                 Moderate Decrea

se



                                                                 in GFR 30-59 St

age



                                                                 IV Severe Decre

ase



                                                                 in GFR 15-29 St

age



                                                                 V Kidney Failur

e



                                                                 <15

 

             CREATININE (test code 0.95 mg/dL   0.55-1.30    N            



             = CREAT)                                            

 

             CALCIUM (test code = 7.3 mg/dL    8.2-10.1     L            



             CA)                                                 



HGB [id number] 05:48:00





             Test Item    Value        Reference Range Interpretation Comments

 

             HEMOGLOBIN (test code = HGB) 11.2 g/dL    12-16        L           

 

 

             HEMATOCRIT (test code = HCT) 33.2 %       37-47        L           

 



COMPREHENSIVE METABOLIC PANEL2019-10-31 13:03:00





             Test Item    Value        Reference Range Interpretation Comments

 

             SODIUM (test code = 139 mmol/L   136-145      N            



             NA)                                                 

 

             POTASSIUM (test code = 4.2 mmol/L   3.5-5.1      N            



             K)                                                  

 

             CHLORIDE (test code = 102.0 mmol/L        N            



             CL)                                                 

 

             CARBON DIOXIDE (test 27.5 mmol/L  21-32        N            



             code = CO2)                                         

 

             GLUCOSE (test code = 96 mg/dL            N            



             GLU)                                                

 

             BLOOD UREA NITROGEN 16 mg/dL     7-18         N            



             (test code = BUN)                                        

 

             GLOMERULAR FILTRATION 91.4         >60                       Unit o

f measure:



             RATE (test code = GFR)                                        mL/mi

n/1.73



                                                                 [id number]



                                                                 Range:Healthy



                                                                 Adults >90



                                                                 mL/min/1.73 m2 

For



                                                                 Chronic Kidney



                                                                 Disease: Stage 

II



                                                                 Mild Decrease i

n



                                                                 GFR 60-90 Stage

 III



                                                                 Moderate Decrea

se



                                                                 in GFR 30-59 St

age



                                                                 IV Severe Decre

ase



                                                                 in GFR 15-29 St

age



                                                                 V Kidney Failur

e



                                                                 <15

 

             CREATININE (test code 0.84 mg/dL   0.55-1.30    N            



             = CREAT)                                            

 

             TOTAL PROTEIN (test 7.2 g/dL     6.4-8.2      N            



             code = PROT)                                        

 

             ALBUMIN (test code = 4.2 g/dL     3.4-5.0      N            



             ALB)                                                

 

             GLOBULIN (test code = 3.0 g/dL     2.2-4.2      N            



             GLOB)                                               

 

             ALBUMIN/GLOBULIN RATIO 1.4          0.7-2.0      N            



             (test code = A/G)                                        

 

             CALCIUM (test code = 9.4 mg/dL    8.2-10.1     N            



             CA)                                                 

 

             BILIRUBIN TOTAL (test 0.60 mg/dL   0.2-1.00     N            



             code = BILT)                                        

 

             SGOT/AST (test code = 34.0 U/L     15-37        N            



             AST)                                                

 

             SGPT/ALT (test code = 34.0 U/L     12-78        N            Please

 note new



             ALT)                                                normal range.

 

             ALKALINE PHOSPHATASE 53 U/L              N            



             TOTAL (test code =                                        



             ALKP)                                               



CBC W/AUTO DIFF2019-10-31 12:43:00





             Test Item    Value        Reference Range Interpretation Comments

 

             WHITE BLOOD CELL (test code = WBC) 5.3 K/mm3    5.7-10.5     L     

       

 

             RED BLOOD CELL (test code = RBC) 4.40 M/mm3   4.2-5.4      N       

     

 

             HEMOGLOBIN (test code = HGB) 13.7 g/dL    12-16        N           

 

 

             HEMATOCRIT (test code = HCT) 40.7 %       37-47        N           

 

 

             MEAN CELL VOLUME (test code = MCV) 93 fL        80-98        N     

       

 

             MEAN CELL HGB (test code = MCH) 31.1 pg      27-34        N        

    

 

             MEAN CELL HGB CONCENTRATION (test 33.7 g/dL    30.8-34.1    N      

      



             code = MCHC)                                        

 

             RED CELL DISTRIBUTION WIDTH (test 12.7 %       11-16        N      

      



             code = RDW)                                         

 

             PLT (test code = PLT) 258 K/mm3    130-400      N            

 

             MEAN PLATELET VOLUME (test code = 9.8 fL       8.9-12.1     N      

      



             MPV)                                                

 

             NEUTROPHIL % (test code = NT%) 62.8 %       45-70        N         

   

 

             LYMPHOCYTE % (test code = LY%) 24.4 %       20-40        N         

   

 

             MONOCYTE % (test code = MO%) 9.7 %        3-10         N           

 

 

             EOSINOPHIL % (test code = EO%) 1.9 %        1-5          N         

   

 

             BASOPHIL % (test code = BA%) 0.8 %        0.0-1.1      N           

 

 

             NEUTROPHIL # (test code = NT#) 3.32 K/mm3   2.00-7.50    N         

   

 

             LYMPHOCYTE # (test code = LY#) 1.29 K/mm3   1.50-4.00    L         

   

 

             MONOCYTE # (test code = MO#) 0.51 K/mm3   0.2-0.8      N           

 

 

             EOSINOPHIL # (test code = EO#) 0.10 K/mm3   0.04-0.4     N         

   

 

             BASOPHIL # (test code = BA#) 0.04 K/mm3   0.02-0.10    N           

 

 

             MANUAL DIFF REQUIRED (test code = NO           MANUAL DIFF         

      



             MDIFF)                                              

 

             NUCLEATED RED BLOOD CELL (test 0 %          0-0          N         

   



             code = NRBC)

## 2022-12-19 ENCOUNTER — HOSPITAL ENCOUNTER (EMERGENCY)
Dept: HOSPITAL 97 - ER | Age: 67
Discharge: HOME | End: 2022-12-19
Payer: COMMERCIAL

## 2022-12-19 VITALS — SYSTOLIC BLOOD PRESSURE: 139 MMHG | OXYGEN SATURATION: 100 % | TEMPERATURE: 97.8 F | DIASTOLIC BLOOD PRESSURE: 76 MMHG

## 2022-12-19 DIAGNOSIS — M25.571: Primary | ICD-10-CM

## 2022-12-19 DIAGNOSIS — Z96.653: ICD-10-CM

## 2022-12-19 PROCEDURE — 99282 EMERGENCY DEPT VISIT SF MDM: CPT

## 2022-12-19 NOTE — ER
Nurse's Notes                                                                                     

 [facility]                                                                 

Name: Dereck Dc                                                                         

Age: 67 yrs                                                                                       

Sex: Male                                                                                         

: 1955                                                                                   

MRN: [id number]                                                                                   

Arrival Date: 2022                                                                          

Time: 11:37                                                                                       

Account#: [id number]                                                                            

Bed IW2                                                                                           

Private MD:                                                                                       

Diagnosis: Pain in right ankle and joints of right foot                                           

                                                                                                  

Presentation:                                                                                     

                                                                                             

12:00 Chief complaint: Patient states: fell of ladder on Friday; came in and was seen but now [facility] 

      his right ankle is hurting. Coronavirus screen: Vaccine status: Patient reports             

      receiving the 2nd dose of the covid vaccine. Client denies travel out of the [place] in        

      the last 14 days. Ebola Screen: Patient negative for fever greater than or equal to         

      101.5 degrees Fahrenheit, and additional compatible Ebola Virus Disease symptoms            

      Patient denies exposure to infectious person. Patient denies travel to an                   

      Ebola-affected area in the 21 days before illness onset. Initial Sepsis Screen: Does        

      the patient meet any 2 criteria? No. Patient's initial sepsis screen is negative. Does      

      the patient have a suspected source of infection? No. Patient's initial sepsis screen       

      is negative. Risk Assessment: Do you want to hurt yourself or someone else? Patient         

      reports no desire to harm self or others.                                                   

12:00 Method Of Arrival: Ambulatory                                                           [facility] 

12:00 Acuity: TD 4                                                                           5 

                                                                                                  

Triage Assessment:                                                                                

12:03 General: Appears uncomfortable, slender, well groomed, well developed, Behavior is      5 

      calm, cooperative, appropriate for age. Pain: Complains of pain in right foot, right        

      ankle. Musculoskeletal: Reports pain in right ankle. Injury Description: fall.              

                                                                                                  

Historical:                                                                                       

- PMHx:                                                                                           

12:03 Hypercholesterolemia; Hypertensive disorder;                                            [facility] 

- PSHx:                                                                                           

12:03 bilateral knee replacement;                                                             [facility] 

                                                                                                  

- Immunization history:: Adult Immunizations up to date.                                          

- Social history:: Smoking status: Patient denies any tobacco usage or history of.                

                                                                                                  

                                                                                                  

Vital Signs:                                                                                      

12:00  / 76; Pulse 72; Resp 16; Temp 97.8; Pulse Ox 100% ; Weight 83.91 kg; Height 5    jh5 

      ft. 6 in. (167.64 cm); Pain 8/10;                                                           

12:00 Body Mass Index 29.86 (83.91 kg, 167.64 cm)                                             jh5 

                                                                                                  

ED Course:                                                                                        

11:37 Patient arrived in ED.                                                                  mr  

11:38 [facility]                                                        kb  

11:38 Ge Guan DO is Attending Physician.                                                kb  

12:02 Triage completed.                                                                       jh5 

12:03 Arm band placed on right wrist.                                                         jh5 

13:12 Ankle Right 3 View XRAY In Process Unspecified.                                         EDMS

                                                                                                  

Administered Medications:                                                                         

No medications were administered                                                                  

                                                                                                  

                                                                                                  

Outcome:                                                                                          

13:37 Discharge ordered by MD.                                                                kb  

13:44 Patient left the ED.                                                                    5 

                                                                                                  

Signatures:                                                                                       

Dispatcher [employer]                           EDMS                                                 

Meaghan Hayes FNP-C FNP-Ckb Rivera, Mary                                 mr Nazario, Ema, RN                       RN   jh5                                                  

                                                                                                  

**************************************************************************************************

## 2022-12-19 NOTE — XMS REPORT
Continuity of Care Document

                          Created on:2022



Patient:JEREMIAH NAYAK

Sex:Male

:1955

External Reference #:[id number]





Demographics







                          Address                   [address] [place] [address]

 

                          Home Phone                [phone]

 

                          Work Phone                [phone]

 

                          Email Address             [email or web address]

 

                          Preferred Language        English

 

                          Marital Status            Unknown

 

                          [employer] Affiliation     Unknown

 

                          Race                      Unknown

 

                          Additional Race(s)        Unavailable



                                                    Unavailable

 

                          Ethnic Group              Unknown









Author







                          Organization              [facility]

t

 

                          Address                   [address]



                                                    [place] [address]

 

                          Phone                     [phone]









Support







                Name            Relationship    Address         Phone

 

                YEFRI NAYAK Unavailable     [address] [phone]

[phone]



                                                [place] [address] 

 

                YEFRI NAYAK Unavailable     [address] [phone]

[phone]



                                                [place] [address] 

 

                JEREMIAH NAYAK Unavailable     .               [phone]

[phone]









Care Team Providers







                    Name                Role                Phone

 

                    Jon Attending Clinician Unavailable

 

                    Seb          Attending Clinician Unavailable

 

                    Casey Gorman   Attending Clinician [phone]

 

                    Joe Holly     Attending Clinician Unavailable

 

                    Jon Admitting Clinician Unavailable

 

                    Seb          Admitting Clinician Unavailable

 

                    Physician, No Primary or Family Admitting Clinician Unavaila

ble









Payers







           Payer Name Policy Type Policy Number Effective Date Expiration Date JESÚS CRUZ (MEDICARE            [id number] 2020            



           REPLACEMENT PPO)                       00:00:00              







Problems







       Condition Condition Condition Status Onset  Resolution Last   Treating Co

mments 

Source



       Name   Details Category        Date   Date   Treatment Clinician        



                                                 Date                 

 

       Hypertensi Hypertensi Problem Active 2016                             [facility]

[place]



       ve     ve                   2-27                               [facility]



       disorder Disorder               00:00:                             U[facility]

y



                                   00                                 

 

       Raised Raised Problem Active 2016                             [facility]



       prostate Prostate                                              [facility]



       specific Specific               00:00:                             [facility]

y



       antigen Antigen               00                                 

 

       Lower  Lower  Problem Active 2016                             [facility]



       urinary Urinary               -27                               [facility]



       tract  Tract                00:00:                             [facility]



       symptoms Symptoms               00                                 



       due to Due to                                                  



       benign Benign                                                  



       prostatic Prostatic                                                  



       hypertroph Hypertroph                                                  



       y      y                                                       







Allergies, Adverse Reactions, Alerts







       Allergy Allergy Status Severity Reaction(s) Onset  Inactive Treating Comm

ents 

Source



       Name   Type                        Date   Date   Clinician        

 

       No Known DA     Active U             2018                      [facility]



       Allergie                             0                        [place]



       s                                  00:00:                      [facility]



                                          00                          dic



                                                                      [facility]



                                                                      l

 

       No Known DA     Active U             2018                      [facility]



       Drug                               0-30                        [place]



       Allergie                             00:00:                      [facility]



       s                                  00                          dic



                                                                      [facility]osp[facility]



                                                                      l

 

       pollen DA     Active SV     SNEEZING, 2018                      [facility]



       extracts                      WATERY EYES 0-30                        [facility]

as



                                          00:00:                      [facility]



                                          00                          dic



                                                                      [facility]ospita



                                                                      l

 

       No Known DA     Active U             2018                      [facility]



       Drug                               0-30                        [facility]



       Allergie                             00:00:                      [place]



       s                                  00                          [facility]

 

       pollen DA     Active SV            2018                      [facility]



       extracts                             0-30                        [facility]



                                          00:00:                      [place]



                                          00                          [facility]







Social History







                Smoking Status  Start Date      Stop Date       Source

 

                Never Smoker                                    [facility]

ology







Medications







       Ordered Filled Start  Stop   Current Ordering Indication Dosage Frequency

 Signature

                    Comments            Components          Source



     Medication Medication Date Date Medication? Clinician                (SIG) 

          



     Name Name                                                   

 

     alendronate alendronate           No                       alendronat      

     [facility]



     70 mg 70 mg                                    e 70 mg           [facility]



     tablet TAKE tablet TAKE                                    tablet          

 [facility]



     1 TABLET BY 1 TABLET BY                                    TAKE 1          

 



     MOUTH ONE MOUTH ONE                                    TABLET BY           



     TIME PER TIME PER                                    MOUTH ONE           



     WEEK WEEK                                    TIME PER           



                                                  WEEK           

 

     amoxicillin amoxicillin           No                       amoxicilli      

     [facility]



     500 mg 500 mg                                    n 500 mg           [facility]



     capsule capsule                                    capsule           U[facility]

y



     TAKE 1 TAKE 1                                    TAKE 1           



     CAPSULE BY CAPSULE BY                                    CAPSULE BY        

   



     MOUTH 3 MOUTH 3                                    MOUTH 3           



     TIMES A DAY TIMES A DAY                                    TIMES A         

  



     FOR 3 DAYS. FOR 3 DAYS.                                    DAY FOR 3       

    



     START A DAY START A DAY                                    DAYS.           



     PRIOR TO PRIOR TO                                    START A           



     DENTAL DENTAL                                    DAY PRIOR           



     APPOINTMENT APPOINTMENT                                    TO DENTAL       

    



                                                  APPOINTMEN           



                                                  T              

 

     amoxicillin amoxicillin           No                       amoxicilli      

     [facility]



     500  500                                     n 500           [facility]



     mg-potassiu mg-potassiu                                    mg-potassi      

     [facility]



     m    m                                       um             



     clavulanate clavulanate                                    clavulanat      

     



     125 mg 125 mg                                    e 125 mg           



     tablet TAKE tablet TAKE                                    tablet          

 



     ONE TABLET ONE TABLET                                    TAKE ONE          

 



     BY MOUTH BY MOUTH                                    TABLET BY           



     EVERY EVERY                                    MOUTH           



     TWELVE TWELVE                                    EVERY           



     HOURS UNTIL HOURS UNTIL                                    TWELVE          

 



     GONE GONE                                    HOURS           



                                                  UNTIL GONE           

 

     amoxicillin amoxicillin           No                       amoxicilli      

     [facility]



     875  875                                     n 875           [facility]



     mg-potassiu mg-potassiu                                    mg-potassi      

     [facility]



     m    m                                       um             



     clavulanate clavulanate                                    clavulanat      

     



     125 mg 125 mg                                    e 125 mg           



     tablet TAKE tablet TAKE                                    tablet          

 



     1 TABLET BY 1 TABLET BY                                    TAKE 1          

 



     MOUTH EVERY MOUTH EVERY                                    TABLET BY       

    



     12 HOURS 12 HOURS                                    MOUTH           



                                                  EVERY 12           



                                                  HOURS           

 

     aspirin 81 aspirin 81           No                       aspirin 81        

   [facility]



     mg   mg                                      mg             [facility]



     tablet,dalia tablet,dalia                                    tablet,del      

     [facility]



     yed release yed release                                    ayed           



                                                  release           

 

     atorvastati atorvastati           No                       atorvastat      

     [facility]



     n 20 mg n 20 mg                                    in 20 mg           [facility]



     tablet TAKE tablet TAKE                                    tablet          

 [facility]



     1 TABLET BY 1 TABLET BY                                    TAKE 1          

 



     MOUTH EVERY MOUTH EVERY                                    TABLET BY       

    



     DAY  DAY                                     MOUTH           



                                                  EVERY DAY           

 

     betamethaso betamethaso           No                       betamethas      

     [facility]



     ne   ne                                      one            [facility]



     dipropionat dipropionat                                    dipropiona      

     [facility]



     e 0.05 % e 0.05 %                                    te 0.05 %           



     topical topical                                    topical           



     cream APPLY cream APPLY                                    cream           



     TO RASH ON TO RASH ON                                    APPLY TO          

 



     LEGS TWICE LEGS TWICE                                    RASH ON           



     A DAY FOR 2 A DAY FOR 2                                    LEGS TWICE      

     



     WEEKS/MONTH WEEKS/MONTH                                    A DAY FOR       

    



     .    .                                       2              



                                                  WEEKS/DAMIAN           



                                                  H.             

 

     Bystolic 5 Bystolic 5           No                       Bystolic 5        

   [facility]



     mg tablet mg tablet                                    mg tablet           

[facility]



                                                                 [facility]

 

     chlorhexidi chlorhexidi           No                       chlorhexid      

     [facility]



     ne   ne                                      ine            [facility]



     gluconate gluconate                                    gluconate           

[facility]



     0.12 % 0.12 %                                    0.12 %           



     mouthwash mouthwash                                    mouthwash           



     TAKE 15 ML TAKE 15 ML                                    TAKE 15 ML        

   



     BY MOUTH BY MOUTH                                    BY MOUTH           



     EVERY EVERY                                    EVERY           



     TWELVE TWELVE                                    TWELVE           



     HOURS. HOURS.                                    HOURS.           



     SWISH FOR SWISH FOR                                    SWISH FOR           



     TWO MINUTES TWO MINUTES                                    TWO            



     AND SPIT. AND SPIT.                                    MINUTES           



     DO NOT DO NOT                                    AND SPIT.           



     SWALLOW. SWALLOW.                                    DO NOT           



                                                  SWALLOW.           

 

     Clenpiq 10 Clenpiq 10           No                       Clenpiq 10        

   [facility]



     mg-3.5 mg-3.5                                    mg-3.5           [facility]



     gram-12 gram-12                                    gram-12           [facility]

y



     gram/160 mL gram/160 mL                                    gram/160        

   



     oral oral                                    mL oral           



     solution solution                                    solution           



     TAKE AS TAKE AS                                    TAKE AS           



     DIRECTED DIRECTED                                    DIRECTED           

 

     diclofenac diclofenac           No                       diclofenac        

   [facility]



     1 % topical 1 % topical                                    1 %            M

[facility]



     gel APPLY 1 gel APPLY 1                                    topical         

  [facility]



     GRAM TO GRAM TO                                    gel APPLY           



     AFFECTED AFFECTED                                    1 GRAM TO           



     AREA TWICE AREA TWICE                                    AFFECTED          

 



     A DAY AS A DAY AS                                    AREA TWICE           



     NEEDED CAN NEEDED CAN                                    A DAY AS          

 



     APPLY UP TO APPLY UP TO                                    NEEDED CAN      

     



     2 GRAMS IF 2 GRAMS IF                                    APPLY UP          

 



     NEEDED NEEDED                                    TO 2 GRAMS           



                                                  IF NEEDED           

 

     lisinopril lisinopril           No                       lisinopril        

   [facility]



     10 mg 10 mg                                    10 mg           [facility]



     tablet TAKE tablet TAKE                                    tablet          

 [facility]



     1 TABLET BY 1 TABLET BY                                    TAKE 1          

 



     MOUTH EVERY MOUTH EVERY                                    TABLET BY       

    



     DAY  DAY                                     MOUTH           



                                                  EVERY DAY           

 

     meloxicam meloxicam           No                       meloxicam           

[facility]



     15 mg 15 mg                                    15 mg           [facility]



     tablet TAKE tablet TAKE                                    tablet          

 [facility]



     1 TABLET BY 1 TABLET BY                                    TAKE 1          

 



     MOUTH EVERY MOUTH EVERY                                    TABLET BY       

    



     DAY WITH A DAY WITH A                                    MOUTH           



     MEAL MEAL                                    EVERY DAY           



                                                  WITH A           



                                                  MEAL           

 

     metoprolol metoprolol           No                       metoprolol        

   [facility]



     succinate succinate                                    succinate           

[facility]



     ER 25 mg ER 25 mg                                    ER 25 mg           [facility]

logy



     tablet,exte tablet,exte                                    tablet,ext      

     



     nded nded                                    ended           



     release 24 release 24                                    release 24        

   



     hr TAKE 1 hr TAKE 1                                    hr TAKE 1           



     TABLET BY TABLET BY                                    TABLET BY           



     MOUTH MOUTH                                    MOUTH           



     EVERYDAY AT EVERYDAY AT                                    EVERYDAY        

   



     BEDTIME BEDTIME                                    AT BEDTIME           

 

     prednisone prednisone           No                       prednisone        

   [facility]



     10 mg 10 mg                                    10 mg           [facility]



     tablet tablet                                    tablet           [facility]



     PLEASE SEE PLEASE SEE                                    PLEASE SEE        

   



     ATTACHED ATTACHED                                    ATTACHED           



     FOR  FOR                                     FOR            



     DETAILED DETAILED                                    DETAILED           



     DIRECTIONS DIRECTIONS                                    DIRECTIONS        

   

 

     tramadol 50 tramadol 50           No                       tramadol        

   [facility]



     mg tablet mg tablet                                    50 mg           [facility]

o



     TAKE 1 TAKE 1                                    tablet           [facility]



     TABLET BY TABLET BY                                    TAKE 1           



     MOUTH EVERY MOUTH EVERY                                    TABLET BY       

    



     4 TO 6 4 TO 6                                    MOUTH           



     HOURS HOURS                                    EVERY 4 TO           



                                                  6 HOURS           

 

     alendronate alendronate           No                       alendronat      

     [facility]



     70 mg 70 mg                                    e 70 mg           [facility]



     tablet TAKE tablet TAKE                                    tablet          

 [facility]



     1 TABLET BY 1 TABLET BY                                    TAKE 1          

 



     MOUTH ONE MOUTH ONE                                    TABLET BY           



     TIME PER TIME PER                                    MOUTH ONE           



     WEEK WEEK                                    TIME PER           



                                                  WEEK           

 

     amoxicillin amoxicillin           No                       amoxicilli      

     [facility]



     500 mg 500 mg                                    n 500 mg           [facility]



     capsule capsule                                    capsule           U[facility]

y



     TAKE 1 TAKE 1                                    TAKE 1           



     CAPSULE BY CAPSULE BY                                    CAPSULE BY        

   



     MOUTH THREE MOUTH THREE                                    MOUTH           



     TIMES A DAY TIMES A DAY                                    THREE           



     X 3 DAYS X 3 DAYS                                    TIMES A           



     START A DAY START A DAY                                    DAY X 3         

  



     PRIOR TO PRIOR TO                                    DAYS START           



     DENTAL DENTAL                                    A DAY           



     APPOINTMENT APPOINTMENT                                    PRIOR TO        

   



                                                  DENTAL           



                                                  APPOINTMEN           



                                                  T              

 

     aspirin 81 aspirin 81           No                       aspirin 81        

   [facility]



     mg   mg                                      mg             [facility]



     tablet,dalia tablet,dalia                                    tablet,del      

     [facility]



     yed release yed release                                    ayed           



                                                  release           

 

     atorvastati atorvastati           No                       atorvastat      

     [facility]



     n 20 mg n 20 mg                                    in 20 mg           [facility]



     tablet TAKE tablet TAKE                                    tablet          

 [facility]



     1 TABLET BY 1 TABLET BY                                    TAKE 1          

 



     MOUTH EVERY MOUTH EVERY                                    TABLET BY       

    



     DAY  DAY                                     MOUTH           



                                                  EVERY DAY           

 

     Bystolic 5 Bystolic 5           No                       Bystolic 5        

   [facility]



     mg tablet mg tablet                                    mg tablet           

[facility]



                                                                 [facility]

 

     lisinopril lisinopril           No                       lisinopril        

   [facility]



     10 mg 10 mg                                    10 mg           [facility]



     tablet TAKE tablet TAKE                                    tablet          

 [facility]



     1 TABLET BY 1 TABLET BY                                    TAKE 1          

 



     MOUTH EVERY MOUTH EVERY                                    TABLET BY       

    



     DAY  DAY                                     MOUTH           



                                                  EVERY DAY           

 

     metoprolol metoprolol           No                       metoprolol        

   [facility]



     succinate succinate                                    succinate           

[facility]



     ER 25 mg ER 25 mg                                    ER 25 mg           [facility]

logy



     tablet,exte tablet,exte                                    tablet,ext      

     



     nded nded                                    ended           



     release 24 release 24                                    release 24        

   



     hr TAKE 1 hr TAKE 1                                    hr TAKE 1           



     TABLET BY TABLET BY                                    TABLET BY           



     MOUTH MOUTH                                    MOUTH           



     EVERYDAY AT EVERYDAY AT                                    EVERYDAY        

   



     BEDTIME BEDTIME                                    AT BEDTIME           

 

     tramadol 50 tramadol 50           No                       tramadol        

   [facility]



     mg tablet mg tablet                                    50 mg           [facility]

[facility]



     TAKE 1 TAKE 1                                    tablet           [facility]



     TABLET BY TABLET BY                                    TAKE 1           



     MOUTH EVERY MOUTH EVERY                                    TABLET BY       

    



     4 TO 6 4 TO 6                                    MOUTH           



     HOURS HOURS                                    EVERY 4 TO           



                                                  6 HOURS           

 

     alendronate alendronate           No                       alendronat      

     [facility]



     70 mg 70 mg                                    e 70 mg           [facility]



     tablet TAKE tablet TAKE                                    tablet          

 [facility]



     1 TABLET BY 1 TABLET BY                                    TAKE 1          

 



     MOUTH ONE MOUTH ONE                                    TABLET BY           



     TIME PER TIME PER                                    MOUTH ONE           



     WEEK WEEK                                    TIME PER           



                                                  WEEK           

 

     amoxicillin amoxicillin           No                       amoxicilli      

     [facility]



     500 mg 500 mg                                    n 500 mg           [facility]



     capsule capsule                                    capsule           [facility]

y



     TAKE 1 TAKE 1                                    TAKE 1           



     CAPSULE BY CAPSULE BY                                    CAPSULE BY        

   



     MOUTH THREE MOUTH THREE                                    MOUTH           



     TIMES A DAY TIMES A DAY                                    THREE           



     X 3 DAYS X 3 DAYS                                    TIMES A           



     START A DAY START A DAY                                    DAY X 3         

  



     PRIOR TO PRIOR TO                                    DAYS START           



     DENTAL DENTAL                                    A DAY           



     APPOINTMENT APPOINTMENT                                    PRIOR TO        

   



                                                  DENTAL           



                                                  APPOINTMEN           



                                                  T              

 

     aspirin 81 aspirin 81           No                       aspirin 81        

   [facility]



     mg   mg                                      mg             [facility]



     tablet,dalia tablet,dalia                                    tablet,del      

     [facility]



     yed release yed release                                    ayed           



                                                  release           

 

     atorvastati atorvastati           No                       atorvastat      

     [facility]



     n 20 mg n 20 mg                                    in 20 mg           [facility]



     tablet TAKE tablet TAKE                                    tablet          

 [facility]



     1 TABLET BY 1 TABLET BY                                    TAKE 1          

 



     MOUTH EVERY MOUTH EVERY                                    TABLET BY       

    



     DAY  DAY                                     MOUTH           



                                                  EVERY DAY           

 

     Bystolic 5 Bystolic 5           No                       Bystolic 5        

   [facility]



     mg tablet mg tablet                                    mg tablet           

[facility]



                                                                 [facility]

 

     lisinopril lisinopril           No                       lisinopril        

   [facility]



     10 mg 10 mg                                    10 mg           [facility]



     tablet TAKE tablet TAKE                                    tablet          

 [facility]



     1 TABLET BY 1 TABLET BY                                    TAKE 1          

 



     MOUTH EVERY MOUTH EVERY                                    TABLET BY       

    



     DAY  DAY                                     MOUTH           



                                                  EVERY DAY           

 

     metoprolol metoprolol           No                       metoprolol        

   [facility]



     succinate succinate                                    succinate           

[facility]



     ER 25 mg ER 25 mg                                    ER 25 mg           [facility]

logy



     tablet,exte tablet,exte                                    tablet,ext      

     



     nded nded                                    ended           



     release 24 release 24                                    release 24        

   



     hr TAKE 1 hr TAKE 1                                    hr TAKE 1           



     TABLET BY TABLET BY                                    TABLET BY           



     MOUTH MOUTH                                    MOUTH           



     EVERYDAY AT EVERYDAY AT                                    EVERYDAY        

   



     BEDTIME BEDTIME                                    AT BEDTIME           

 

     tramadol 50 tramadol 50           No                       tramadol        

   [facility]



     mg tablet mg tablet                                    50 mg           [facility]

o



     TAKE 1 TAKE 1                                    tablet           [facility]



     TABLET BY TABLET BY                                    TAKE 1           



     MOUTH EVERY MOUTH EVERY                                    TABLET BY       

    



     4 TO 6 4 TO 6                                    MOUTH           



     HOURS HOURS                                    EVERY 4 TO           



                                                  6 HOURS           

 

     alendronate alendronate           No                       alendronat      

     [facility]



     70 mg 70 mg                                    e 70 mg           [facility]



     tablet TAKE tablet TAKE                                    tablet          

 [facility]



     1 TABLET BY 1 TABLET BY                                    TAKE 1          

 



     MOUTH ONCE MOUTH ONCE                                    TABLET BY         

  



     A WEEK A WEEK                                    MOUTH ONCE           



                                                  A WEEK           

 

     amoxicillin amoxicillin           No                       amoxicilli      

     [facility]



     500 mg 500 mg                                    n 500 mg           [facility]



     capsule capsule                                    capsule           [facility]

y



     TAKE 1 TAKE 1                                    TAKE 1           



     CAPSULE BY CAPSULE BY                                    CAPSULE BY        

   



     MOUTH 3 MOUTH 3                                    MOUTH 3           



     TIMES A DAY TIMES A DAY                                    TIMES A         

  



     FOR 3 DAYS. FOR 3 DAYS.                                    DAY FOR 3       

    



     START A DAY START A DAY                                    DAYS.           



     PRIOR TO PRIOR TO                                    START A           



     DENTAL DENTAL                                    DAY PRIOR           



     APPOINTMENT APPOINTMENT                                    TO DENTAL       

    



                                                  APPOINTMEN           



                                                  T              

 

     aspirin 81 aspirin 81           No                       aspirin 81        

   [facility]



     mg   mg                                      mg             [facility]



     tablet,dalia tablet,dalia                                    tablet,del      

     [facility]



     yed release yed release                                    ayed           



                                                  release           

 

     atorvastati atorvastati           No                       atorvastat      

     [facility]



     n 20 mg n 20 mg                                    in 20 mg           [facility]



     tablet TAKE tablet TAKE                                    tablet          

 [facility]



     1 TABLET BY 1 TABLET BY                                    TAKE 1          

 



     MOUTH EVERY MOUTH EVERY                                    TABLET BY       

    



     DAY  DAY                                     MOUTH           



                                                  EVERY DAY           

 

     betamethaso betamethaso           No                       betamethas      

     [facility]



     ne   ne                                      one            [facility]



     dipropionat dipropionat                                    dipropiona      

     [facility]



     e 0.05 % e 0.05 %                                    te 0.05 %           



     topical topical                                    topical           



     cream APPLY cream APPLY                                    cream           



     TO RASH ON TO RASH ON                                    APPLY TO          

 



     LEGS TWICE LEGS TWICE                                    RASH ON           



     A DAY FOR 2 A DAY FOR 2                                    LEGS TWICE      

     



     WEEKS/MONTH WEEKS/MONTH                                    A DAY FOR       

    



     .    .                                       2              



                                                  WEEKS/DAMIAN           



                                                  H.             

 

     Bystolic 5 Bystolic 5           No                       Bystolic 5        

   [facility]



     mg tablet mg tablet                                    mg tablet           

[facility]



                                                                 [facility]

 

     Clenpiq 10 Clenpiq 10           No                       Clenpiq 10        

   [facility]



     mg-3.5 mg-3.5                                    mg-3.5           [facility]



     gram-12 gram-12                                    gram-12           [facility]

y



     gram/160 mL gram/160 mL                                    gram/160        

   



     oral oral                                    mL oral           



     solution solution                                    solution           



     TAKE AS TAKE AS                                    TAKE AS           



     DIRECTED DIRECTED                                    DIRECTED           

 

     diclofenac diclofenac           No                       diclofenac        

   [facility]



     1 % topical 1 % topical                                    1 %            M

[facility]



     gel APPLY 1 gel APPLY 1                                    topical         

  [facility]



     GRAM TO GRAM TO                                    gel APPLY           



     AFFECTED AFFECTED                                    1 GRAM TO           



     AREA TWICE AREA TWICE                                    AFFECTED          

 



     A DAY AS A DAY AS                                    AREA TWICE           



     NEEDED CAN NEEDED CAN                                    A DAY AS          

 



     APPLY UP TO APPLY UP TO                                    NEEDED CAN      

     



     2 GRAMS IF 2 GRAMS IF                                    APPLY UP          

 



     NEEDED NEEDED                                    TO 2 GRAMS           



                                                  IF NEEDED           

 

     lisinopril lisinopril           No                       lisinopril        

   [facility]



     10 mg 10 mg                                    10 mg           [facility]



     tablet TAKE tablet TAKE                                    tablet          

 [facility]



     1 TABLET BY 1 TABLET BY                                    TAKE 1          

 



     MOUTH EVERY MOUTH EVERY                                    TABLET BY       

    



     DAY  DAY                                     MOUTH           



                                                  EVERY DAY           

 

     meloxicam meloxicam           No                       meloxicam           

[facility]



     15 mg 15 mg                                    15 mg           [facility]



     tablet TAKE tablet TAKE                                    tablet          

 [facility]



     1 TABLET BY 1 TABLET BY                                    TAKE 1          

 



     MOUTH EVERY MOUTH EVERY                                    TABLET BY       

    



     DAY WITH A DAY WITH A                                    MOUTH           



     MEAL MEAL                                    EVERY DAY           



                                                  WITH A           



                                                  MEAL           

 

     metoprolol metoprolol           No                       metoprolol        

   [facility]



     succinate succinate                                    succinate           

[facility]



     ER 25 mg ER 25 mg                                    ER 25 mg           [facility]

log[facility]



     tablet,exte tablet,exte                                    tablet,ext      

     



     nded nded                                    ended           



     release 24 release 24                                    release 24        

   



     hr TAKE 1 hr TAKE 1                                    hr TAKE 1           



     TABLET BY TABLET BY                                    TABLET BY           



     MOUTH MOUTH                                    MOUTH           



     EVERYDAY AT EVERYDAY AT                                    EVERYDAY        

   



     BEDTIME BEDTIME                                    AT BEDTIME           

 

     prednisone prednisone           No                       prednisone        

   [facility]



     10 mg 10 mg                                    10 mg           [facility]



     tablet TAKE tablet TAKE                                    tablet          

 [facility]



     1 TABLET BY 1 TABLET BY                                    TAKE 1          

 



     MOUTH THREE MOUTH THREE                                    TABLET BY       

    



     TIMES A DAY TIMES A DAY                                    MOUTH           



                                                  THREE           



                                                  TIMES A           



                                                  DAY            

 

     tramadol 50 tramadol 50           No                       tramadol        

   [facility]



     mg tablet mg tablet                                    50 mg           [facility]

o



     TAKE 1 TAKE 1                                    tablet           [facility]



     TABLET BY TABLET BY                                    TAKE 1           



     MOUTH EVERY MOUTH EVERY                                    TABLET BY       

    



     4 TO 6 4 TO 6                                    MOUTH           



     HOURS HOURS                                    EVERY 4 TO           



                                                  6 HOURS           







Immunizations







           Ordered Immunization Filled Immunization Date       Status     Commen

   Source



           Name       Name                                        

 

           influenza, [facility] 2020-10-01 [facility]



           injectable, injectable, 00:00:00                         [facility]



           quadrivalent quadrivalent                                  

 

           influenza, influenza, 2020-10-01 Completed             [facility]



           injectable, injectable, 00:00:00                         [facility]



           quadrivalent quadrivalent                                  

 

           influenza, influenza, 2020-10-01 Completed             [facility]



           injectable, injectable, 00:00:00                         [facility]



           quadrivalent quadrivalent                                  

 

           influenza, influenza, 2020-10-01 Completed             [facility]



           injectable, injectable, 00:00:00                         [facility]



           quadrivalent quadrivalent                                  

 

           influenza, influenza, 2019 Completed             [facility]



           injectable, injectable, 00:00:00                         [facility]



           quadrivalent quadrivalent                                  

 

           influenza, influenza, 2019 Completed             [facility]



           injectable, injectable, 00:00:00                         [facility]



           quadrivalent quadrivalent                                  

 

           influenza, influenza, 2019 Completed             [facility]



           injectable, injectable, 00:00:00                         [facility]



           quadrivalent quadrivalent                                  

 

           influenza, influenza, 2019 Completed             [facility]



           injectable, injectable, 00:00:00                         [facility]



           quadrivalent quadrivalent                                  

 

           pneumococcal pneumococcal 2018 Completed             [facility]

tro



           polysaccharide PPV23 polysaccharide PPV23 00:00:00                   

      U[facility]

 

           pneumococcal pneumococcal 2018 Completed             [facility]

tro



           polysaccharide PPV23 polysaccharide PPV23 00:00:00                   

      [facility]

 

           pneumococcal pneumococcal 2018 Completed             [facility]

tro



           polysaccharide PPV23 polysaccharide PPV23 00:00:00                   

      U[facility]

 

           pneumococcal pneumococcal 2018 Completed             [facility]

tro



           polysaccharide PPV23 polysaccharide PPV23 00:00:00                   

      U[facility]







Vital Signs







             Vital Name   Observation Time Observation Value Comments     Source

 

             BMI (Body Mass 2022 00:00:00 29.1 kg/m2                [facility]

n [facility]



             Index)                                              [facility]

 

             Body Weight  2022 00:00:00 180 [lb_av]               [facility] 

[facility]



                                                                 [facility]

 

             Height       2022 00:00:00 66 [in_i]                 [facility] 

[facility]



                                                                 [facility]

 

             Height       2021 00:00:00 66 [in_i]                 [facility] 

[facility]



                                                                 [facility]

 

             BMI (Body Mass 2021 00:00:00 29.1 kg/m2                [facility]

n [facility]



             Index)                                              [facility]

 

             Body Weight  2021 00:00:00 180 [lb_av]               [facility] 

[facility]



                                                                 [facility]

 

             Height       2021 00:00:00 66 [in_i]                 [facility] 

[facility]



                                                                 [facility]

 

             BMI (Body Mass 2021 00:00:00 29.1 kg/m2                [facility]

n [facility]



             Index)                                              [facility]

 

             Body Weight  2021 00:00:00 180 [lb_av]               [facility] 

[facility]



                                                                 [facility]







Procedures







                Procedure       Date / Time Performed Performing Clinician Taiwo

e

 

                Diagnostic Colonoscopy                                 [facility]



                                                                Urology







Plan of Care







             Planned Activity Planned Date Details      Comments     Source

 

             Diagnostic Test 2022-11-15   PSA, serum or              [facility]

[facility]



             Pending      00:00:00     plasma [code = PSA,              [facility]



                                       serum or plasma]              

 

             Diagnostic Test 2022-11-15   urinalysis,               [facility]

o



             Pending      00:00:00     dipstick [code =              [facility]



                                       urinalysis,               



                                       dipstick]                 

 

             Future Appointment 2023   Brant Mccoy



                          00:00:00     [id number] [facility]              Urology



                                       [address]; , [place], [place]              



                                       [phone]                







Encounters







        Start   End     Encounter Admission Attending Care    Care    Encounter 

Source



        Date/Time Date/Time Type    Type    Clinicians Facility Department ID   

   

 

        2022 Outpatient         [facility]    [facility]    [id number]

[id number]



        00:00:00 00:00:00                 [facility]                 [id number]  Ortho

pe



                                                                        dic



                                                                        Sports



                                                                        [facility]



                                                                        e

 

        2022 Outpatient         [facility]     U     [address]

 [place]



        00:00:00 00:00:00                                         [id number]   [facility]



                                                                        [facility]

 

        2022 Outpatient         [facility]     [facility]     [address]

 [place]



        00:00:00 00:00:00                                         [id number]   [facility]



                                                                        U[facility]

 

        2022-11-15 2022-11-15 Outpatient         [facility]     [facility]     [address]

[address] [place]



        00:00:00 00:00:00                                         [id number]   [facility]



                                                                        Urology

 

        2022-11-15 2022-11-15 Casey                 [facility] [place] [place]     [place] -    [id number] H

james



        00:00:00 00:00:00 Emiliano Gorman MD: [id number]                         [facility]                          [facility]         



                        [address]          



                        [address],                                           



                        [place], [place]                                              



                        [address]                                         



                        , Ph.                                           



                        [phone]                                         

 

        2022 Outpatient         [facility]     [facility]     [address]

 [place]



        00:00:00 00:00:00                                         [id number]   [facility]



                                                                        Urology

 

        2022 Outpatient         [facility]     [facility]     [address]

 [place]



        10:18:00 10:18:00                                         [id number]   [facility]



                                                                        Urology

 

        2022 Outpatient         [facility]     [facility]     [address]

 [place]



        11:48:00 11:48:00                                         [id number]   [facility]



                                                                        Urology

 

        2022 Outpatient         Sherif, U     U     [id number]

[id number] 



        00:00:00 00:00:00                 Casey                 [id number] 



                                                                [id number] 



                                                                [id number]  

 

        2022 Casey                 [facility] [place]     [place] -    [id number] H

[facility]



        00:00:00 00:00:00 Emiliano Gorman MD: [id number]                         [facility]           Urology



                        [place]                          Urology [facility]         



                        [address]                           [address]          



                        [address],                                           



                        [place], [place]                                              



                        [address]                                         



                        , Ph.                                           



                        [phone]                                           



                        [phone]                                         

 

        2021 Outpatient         [facility]     [facility]     [address]

[id number] [address]



        11:34:00 11:34:00                                         [id number]   [facility]



                                                                        Urology

 

        2021 Outpatient         [facility]     [facility]     [address]

[id number] [address]



        11:42:00 11:42:00                                         [id number]   [facility]



                                                                        Urology

 

        2021 Outpatient         Sherif, U     U     [id number]

[id number] 



        00:00:00 00:00:00                 Casey                 [id number] 



                                                                [id number] 



                                                                [id number]  

 

        2021 Casey                 [facility] [place]     [place] -    [id number] [facility]



        00:00:00 00:00:00 Emiliano Gorman MD: [id number]                         [facility]           Urology



                        [place]                          Urology [facility]         



                        [address]                           [address]          



                        [address],                                           



                        [place], [place]                                              



                        [address]                                         



                        , Ph.                                           



                        [phone]                                           



                        [phone]                                         

 

        2021 Outpatient         [facility]     U     [address]

 [place]



        05:25:00 05:25:00                                         [id number]   [facility]



                                                                        Urology

 

        2021 Outpatient         [facility]     U     [address]

 [place]



        01:37:00 01:37:00                                         [id number]   [facility]



                                                                        Urology

 

        2021 Outpatient         Sherif, U     U     [id number]

[id number] 



        00:00:00 00:00:00                 Casey                 [id number] 



                                                                [id number] 



                                                                [id number]  

 

        2021 Casey                 [facility] [place]     [place] -    [id number] [facility]



        00:00:00 00:00:00 Emiliano Gorman MD: [address] [place] [address]           [facility]         



                        [address]                           [address]          



                        [address],                                           



                        [place], [place]                                              



                        [phone]                                         



                        , Ph.                                           



                        [phone]                                         

 

        2020 Outpatient         BILLY HollyTO   SURG    [id number]

[id number] [facility]



        11:45:00 11:45:00                 Joe Leon      [facility]







Results







           Test Description Test Time  Test Comments Results    Result Comments 

Source









                    Urinalysis macro (dipstick) panel - Urine 2022-11-15 10:44:0

0 









                      Test Item  Value      Reference Range Interpretation Comme

nts









             leukocytes (test code = negative     neg                       



             leukocytes)                                         

 

             urobilinogen (test code = 0.2 E.U./dL  sm amt (.5-1mg/dL)          

    



             urobilinogen)                                        

 

             protein (test code = negative     See_Comment                [Autom

ated message] The



             protein)                                            system which ge

nerated



                                                                 this result tra

nsmitted



                                                                 reference range

: <=150



                                                                 mg/d. The refer

ence range



                                                                 was not used to

 interpret



                                                                 this result as



                                                                 normal/abnormal

.

 

             pH (test code = pH) 7.5          4.5-8                     

 

             blood (test code = blood) negative     See_Comment                [

Automated message] The



                                                                 system which ge

nerated



                                                                 this result tra

nsmitted



                                                                 reference range

: <=3 RBC.



                                                                 The reference r

[place] was



                                                                 not used to int

erpret



                                                                 this result as



                                                                 normal/abnormal

.

 

             specific gravity (test code 1.020        1.005-1.025               



             = specific gravity)                                        

 

             ketone (test code = ketone) negative     none                      

 

             bilirubin (test code = negative     neg                       



             bilirubin)                                          

 

             glucose (test code = negative     See_Comment                [Autom

ated message] The



             glucose)                                            system which ge

nerated



                                                                 this result tra

nsmitted



                                                                 reference range

: <=130



                                                                 mg/d. The refer

ence range



                                                                 was not used to

 interpret



                                                                 this result as



                                                                 normal/abnormal

.

 

             color (test code = color) yellow       yellow                    

 

             clarity (test code = clear        clear or cloudy              



             clarity)                                            

 

             nitrite (test code = negative     neg                       



             nitrite)                                            



[facility]- XR KNEE 1 OR 2 V  15:14:00 Patient Name: 
JEREMIAH NAYAK Unit No: [id number] EXAMS: CPT CODE: [id number] XR KNEE 1 OR 
2 V LT 00735  LEFT KNEE 2 VIEWS PORTABLE COMMENT: The patient is status post 
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 ** Electronically Signed by 
Edmund Galaviz MD ** ** on 2019 at 1514 ** Reported and signed by: 
Edmund Galaviz MD  CC: Joe Holly MD Technologist: ARIS BROWN (RT.R) T
missael D/T: 2019 (1514) KimL [facility] NAME: 
JEREMIAH NAYAK  [address] [place] [address] PHYS: Joe Sanchez MD : 
1955 AGE: 64 SEX: M [place], [place] [address]  ACCT NO: [id number] LOC: Y.315
A PHONE #: [phone] EXAM DATE: 2019 STATUS: DIS IN FAX #: [phone]
RAD #: D/C DT 2019 PAGE 1 Signed Report Patient Name: JEREMIAH NAYAK 
Unit No: [id number] EXAMS:  CPT CODE: [id number] XR KNEE 1 OR 2 V LT [address] 
(Continued) Orig Print D/T: S: 2019 (1517)  [facility] 
NAME: NAYAK,[address] PHYS: Joe Sanchez MD : 
1955 AGE: 64 SEX: M [place], [place] [address] ACCT NO: [id number] LOC: Y.315 
A PHONE #: [phone]  EXAM DATE: 2019 STATUS: DIS IN FAX #: 
[phone] RAD #: D/C DT 2019PAGE 2 Signed ReportBASIC METABOLIC PANEL
2019 06:51:00





             Test Item    Value        Reference Range Interpretation Comments

 

             SODIUM (test code = 138 mmol/L   136-145      N            



             NA)                                                 

 

             POTASSIUM (test code = 4.7 mmol/L   3.5-5.1      N            



             K)                                                  

 

             CHLORIDE (test code = 101.0 mmol/L        N            



             CL)                                                 

 

             CARBON DIOXIDE (test 25.1 mmol/L  21-32        N            



             code = CO2)                                         

 

             GLUCOSE (test code = 110 mg/dL           N            



             GLU)                                                

 

             BLOOD UREA NITROGEN 15 mg/dL     7-18         N            



             (test code = BUN)                                        

 

             GLOMERULAR FILTRATION 79.3         >60                       Unit o

f measure:



             RATE (test code = GFR)                                        mL/mi

n/1.73



                                                                 [id number]



                                                                 Range:Healthy



                                                                 Adults >90



                                                                 mL/min/1.73 m2 

For



                                                                 Chronic Kidney



                                                                 Disease: Stage 

II



                                                                 Mild Decrease i

n



                                                                 GFR 60-90 Stage

 III



                                                                 Moderate Decrea

se



                                                                 in GFR 30-59 St

age



                                                                 IV Severe Decre

ase



                                                                 in GFR 15-29 St

age



                                                                 V  Kidney Failu

re



                                                                 <15

 

             CREATININE (test code 0.95 mg/dL   0.55-1.30    N            



             = CREAT)                                            

 

             CALCIUM (test code = 7.3 mg/dL    8.2-10.1     L            



             CA)                                                 



HGB [id number] 05:48:00





             Test Item    Value        Reference Range Interpretation Comments

 

             HEMOGLOBIN (test code = HGB) 11.2 g/dL    12-16        L           

 

 

             HEMATOCRIT (test code = HCT) 33.2 %       37-47        L           

 



COMPREHENSIVE METABOLIC PANEL2019-10-31 13:03:00





             Test Item    Value        Reference Range Interpretation Comments

 

             SODIUM (test code = 139 mmol/L   136-145      N            



             NA)                                                 

 

             POTASSIUM (test code = 4.2 mmol/L   3.5-5.1      N            



             K)                                                  

 

             CHLORIDE (test code = 102.0 mmol/L        N            



             CL)                                                 

 

             CARBON DIOXIDE (test 27.5 mmol/L  21-32        N            



             code = CO2)                                         

 

             GLUCOSE (test code = 96 mg/dL            N            



             GLU)                                                

 

             BLOOD UREA NITROGEN 16 mg/dL     7-18         N            



             (test code = BUN)                                        

 

             GLOMERULAR FILTRATION 91.4         >60                       Unit o

f measure:



             RATE (test code = GFR)                                        mL/mi

n/1.73



                                                                 [id number]



                                                                 Range:Healthy



                                                                 Adults >90



                                                                 mL/min/1.73 m2 

For



                                                                 Chronic Kidney



                                                                 Disease: Stage 

II



                                                                 Mild Decrease i

n



                                                                 GFR 60-90 Stage

 III



                                                                 Moderate Decrea

se



                                                                 in GFR 30-59 St

age



                                                                 IV Severe Decre

ase



                                                                 in GFR 15-29 St

age



                                                                 V Kidney Failur

e



                                                                 <15

 

             CREATININE (test code 0.84 mg/dL   0.55-1.30    N            



             = CREAT)                                            

 

             TOTAL PROTEIN (test 7.2 g/dL     6.4-8.2      N            



             code = PROT)                                        

 

             ALBUMIN (test code = 4.2 g/dL     3.4-5.0      N            



             ALB)                                                

 

             GLOBULIN (test code = 3.0 g/dL     2.2-4.2      N            



             GLOB)                                               

 

             ALBUMIN/GLOBULIN RATIO 1.4          0.7-2.0      N            



             (test code = A/G)                                        

 

             CALCIUM (test code = 9.4 mg/dL    8.2-10.1     N            



             CA)                                                 

 

             BILIRUBIN TOTAL (test 0.60 mg/dL   0.2-1.00     N            



             code = BILT)                                        

 

             SGOT/AST (test code = 34.0 U/L     15-37        N            



             AST)                                                

 

             SGPT/ALT (test code = 34.0 U/L     12-78        N            Please

 note new



             ALT)                                                normal range.

 

             ALKALINE PHOSPHATASE 53 U/L              N            



             TOTAL (test code =                                        



             ALKP)                                               



CBC W/AUTO DIFF2019-10-31 12:43:00





             Test Item    Value        Reference Range Interpretation Comments

 

             WHITE BLOOD CELL (test code = WBC) 5.3 K/mm3    5.7-10.5     L     

       

 

             RED BLOOD CELL (test code = RBC) 4.40 M/mm3   4.2-5.4      N       

     

 

             HEMOGLOBIN (test code = HGB) 13.7 g/dL    12-16        N           

 

 

             HEMATOCRIT (test code = HCT) 40.7 %       37-47        N           

 

 

             MEAN CELL VOLUME (test code = MCV) 93 fL        80-98        N     

       

 

             MEAN CELL HGB (test code = MCH) 31.1 pg      27-34        N        

    

 

             MEAN CELL HGB CONCENTRATION (test 33.7 g/dL    30.8-34.1    N      

      



             code = MCHC)                                        

 

             RED CELL DISTRIBUTION WIDTH (test 12.7 %       11-16        N      

      



             code = RDW)                                         

 

             PLT (test code = PLT) 258 K/mm3    130-400      N            

 

             MEAN PLATELET VOLUME (test code = 9.8 fL       8.9-12.1     N      

      



             MPV)                                                

 

             NEUTROPHIL % (test code = NT%) 62.8 %       45-70        N         

   

 

             LYMPHOCYTE % (test code = LY%) 24.4 %       20-40        N         

   

 

             MONOCYTE % (test code = MO%) 9.7 %        3-10         N           

 

 

             EOSINOPHIL % (test code = EO%) 1.9 %        1-5          N         

   

 

             BASOPHIL % (test code = BA%) 0.8 %        0.0-1.1      N           

 

 

             NEUTROPHIL # (test code = NT#) 3.32 K/mm3   2.00-7.50    N         

   

 

             LYMPHOCYTE # (test code = LY#) 1.29 K/mm3   1.50-4.00    L         

   

 

             MONOCYTE # (test code = MO#) 0.51 K/mm3   0.2-0.8      N           

 

 

             EOSINOPHIL # (test code = EO#) 0.10 K/mm3   0.04-0.4     N         

   

 

             BASOPHIL # (test code = BA#) 0.04 K/mm3   0.02-0.10    N           

 

 

             MANUAL DIFF REQUIRED (test code = NO           MANUAL DIFF         

      



             MDIFF)                                              

 

             NUCLEATED RED BLOOD CELL (test 0 %          0-0          N         

   



             code = NRBC)

## 2022-12-19 NOTE — EDPHYS
Physician Documentation                                                                           

 [facility]                                                                 

Name: Dereck Dc                                                                         

Age: 67 yrs                                                                                       

Sex: Male                                                                                         

: 1955                                                                                   

MRN: [id number]                                                                                   

Arrival Date: 2022                                                                          

Time: 11:37                                                                                       

Account#: [id number]                                                                            

Bed IW2                                                                                           

Private MD:                                                                                       

ED Physician Ge Guan                                                                         

HPI:                                                                                              

                                                                                             

15:47 This 67 yrs old  Male presents to ER via Ambulatory with complaints of Foot     kb  

      Injury.                                                                                     

15:47 The patient presents with pain. The complaints affect the right ankle. Onset: The       kb  

      symptoms/episode began/occurred 3 day(s) ago. Context: The problem was sustained at         

      home, resulted from the patient falling, The patient can fully bear weight on the           

      affected extremity. can ambulate using crutches. Associated signs and symptoms: The         

      patient has no apparent associated signs or symptoms. Modifying factors: The symptoms       

      are alleviated by nothing, the symptoms are aggravated by weight bearing. Severity of       

      symptoms: At their worst the symptoms were mild, moderate, in the emergency department      

      the symptoms are unchanged. The patient has not experienced similar symptoms in the         

      past. The patient has not recently seen a physician. Pt reports he fell off of a ladder     

      on Friday. Was seen here afterwards for pain in right leg, x-ray done and normal.           

      States he woke up with pain to ankle on Saturday, tried to go to PCP today and was told     

      to come back to ER for ankle x-ray.                                                         

                                                                                                  

Historical:                                                                                       

- PMHx:                                                                                           

12:03 Hypercholesterolemia; Hypertensive disorder;                                            jh5 

- PSHx:                                                                                           

12:03 bilateral knee replacement;                                                             5 

                                                                                                  

- Immunization history:: Adult Immunizations up to date.                                          

- Social history:: Smoking status: Patient denies any tobacco usage or history of.                

                                                                                                  

                                                                                                  

ROS:                                                                                              

15:47 Constitutional: Negative for fever, chills, and weight loss.                            kb  

15:47 MS/extremity: Positive for pain, of the anterior aspect of right ankle.                     

15:47 All other systems are negative.                                                             

                                                                                                  

Exam:                                                                                             

15:46 Constitutional:  This is a well developed, well nourished patient who is awake, alert,  kb  

      and in no acute distress. Head/Face:  Normocephalic, atraumatic. ENT:  Moist Mucous         

      membranes Cardiovascular:  Regular rate and rhythm with a normal S1 and S2.  No             

      gallops, murmurs, or rubs.  No pulse deficits. Respiratory:  Respirations even and          

      unlabored. No increased work of breathing. Talking in full sentences Abdomen/GI:  Soft,     

      non-tender. No distention Skin:  Warm, dry with normal turgor.  Normal color. Neuro:        

      Awake and alert, GCS 15, oriented to person, place, time, and situation. Moves all          

      extremities. Normal gait. Psych:  Awake, alert, with orientation to person, place and       

      time.  Behavior, mood, and affect are within normal limits.                                 

15:46 Musculoskeletal/extremity: Extremities: grossly normal except: noted in the anterior        

      aspect of right ankle: pain, ROM: intact in all extremities, Circulation is intact in       

      all extremities. Sensation intact. Weight bearing: can bear weight with assistance          

      only, uses crutches.                                                                        

                                                                                                  

Vital Signs:                                                                                      

12:00  / 76; Pulse 72; Resp 16; Temp 97.8; Pulse Ox 100% ; Weight 83.91 kg; Height 5    jh5 

      ft. 6 in. (167.64 cm); Pain 8/10;                                                           

12:00 Body Mass Index 29.86 (83.91 kg, 167.64 cm)                                             jh5 

                                                                                                  

MDM:                                                                                              

12:00 Patient medically screened.                                                             kb  

13:36 Data reviewed: vital signs, nurses notes. Data interpreted: Pulse oximetry: on room air kb  

      is 100 %. Interpretation: normal. Counseling: I had a detailed discussion with the          

      patient and/or guardian regarding: the historical points, exam findings, and any            

      diagnostic results supporting the discharge/admit diagnosis, radiology results, the         

      need for outpatient follow up, a family practitioner, to return to the emergency            

      department if symptoms worsen or persist or if there are any questions or concerns that     

      arise at home. ED course: Discussed x-ray report with Dr Hernandez. Reports should read      

      "no fracture or dislocation.".                                                              

                                                                                                  

                                                                                             

12:00 Order name: Ankle Right 3 View XRAY; Complete Time: 13:38                               jh5 

                                                                                                  

Administered Medications:                                                                         

No medications were administered                                                                  

                                                                                                  

                                                                                                  

Disposition:                                                                                      

21:59 Co-signature as Attending Physician, Ge Guan DO I was immediately available on-site ms3 

      in the Emergency Department for consultation in the care of the patient. .                  

                                                                                                  

Disposition Summary:                                                                              

22 13:37                                                                                    

Discharge Ordered                                                                                 

      Location: Home                                                                          kb  

      Condition: Stable                                                                       kb  

      Diagnosis                                                                                   

        - Pain in right ankle and joints of right foot                                        kb  

      Followup:                                                                               kb  

        - With: Emergency Department                                                               

        - When: As needed                                                                          

        - Reason: Worsening of condition                                                           

      Followup:                                                                               kb  

        - With: Private Physician                                                                  

        - When: 2 - 3 days                                                                         

        - Reason: Recheck today's complaints, Continuance of care, Re-evaluation by your           

      physician                                                                                   

      Discharge Instructions:                                                                     

        - Discharge Summary Sheet                                                             kb  

        - Musculoskeletal Pain                                                                kb  

      Forms:                                                                                      

        - Medication Reconciliation Form                                                      kb  

        - Thank You Letter                                                                    kb  

        - Antibiotic Education                                                                kb  

        - Prescription Opioid Use                                                             kb  

Signatures:                                                                                       

Dispatcher [employer]                           EDMeaghan Steven, Ge Wilcox DO                        DO   ms3                                                  

Ema Nazario RN                       RN   jh5                                                  

                                                                                                  

**************************************************************************************************

## 2022-12-19 NOTE — RAD REPORT
EXAM DESCRIPTION:  RAD - Ankle Right 3 View - 12/19/2022 1:11 pm

 

CLINICAL HISTORY:  Right ankle pain

 

FINDINGS:  Acute fracture dislocation seen involving the ankle.

 

Linear bony density adjacent to the fifth metatarsal base presumably chronic. This should be correlat
ed clinically.

 

Very large calcaneal spur

## 2025-01-12 ENCOUNTER — HOSPITAL ENCOUNTER (EMERGENCY)
Dept: HOSPITAL 97 - ER | Age: 70
Discharge: HOME | End: 2025-01-12
Payer: COMMERCIAL

## 2025-01-12 DIAGNOSIS — Z11.52: ICD-10-CM

## 2025-01-12 DIAGNOSIS — I10: ICD-10-CM

## 2025-01-12 DIAGNOSIS — J18.9: Primary | ICD-10-CM

## 2025-01-12 DIAGNOSIS — E78.00: ICD-10-CM

## 2025-01-12 LAB — SARS-COV+SARS-COV-2 AG RESP QL IA.RAPID: (no result)

## 2025-01-12 PROCEDURE — 87811 SARS-COV-2 COVID19 W/OPTIC: CPT

## 2025-01-12 PROCEDURE — 87070 CULTURE OTHR SPECIMN AEROBIC: CPT

## 2025-01-12 PROCEDURE — 87807 RSV ASSAY W/OPTIC: CPT

## 2025-01-12 PROCEDURE — 71046 X-RAY EXAM CHEST 2 VIEWS: CPT

## 2025-01-12 PROCEDURE — 87081 CULTURE SCREEN ONLY: CPT

## 2025-01-12 PROCEDURE — 99283 EMERGENCY DEPT VISIT LOW MDM: CPT

## 2025-01-12 PROCEDURE — 36415 COLL VENOUS BLD VENIPUNCTURE: CPT

## 2025-01-12 PROCEDURE — 87804 INFLUENZA ASSAY W/OPTIC: CPT

## 2025-01-12 NOTE — EDPHYS
Physician Documentation                                                                           

 [facility]                                                                 

Name: Dereck Dc                                                                         

Age: 69 yrs                                                                                       

Sex: Male                                                                                         

: 1955                                                                                   

MRN: [id number]                                                                                   

Arrival Date: 2025                                                                          

Time: 09:55                                                                                       

Account#: [id number]                                                                            

Bed 8                                                                                             

Private MD:                                                                                       

ED Physician Ramsey Chairez                                                                         

HPI:                                                                                              

                                                                                             

10:20 This 69 yrs old  Male presents to ER via Ambulatory with complaints of Flu      cp  

      Symptoms.                                                                                   

10:20 The patient or guardian reports cough, that is intermittent, flu symptoms.              cp  

10:20 Onset: The symptoms/episode began/occurred 5 day(s) ago. Associated signs and symptoms: cp  

      Pertinent positives: sore throat, Pertinent negatives: diarrhea, vomiting. Severity of      

      symptoms: in the emergency department the symptoms are unchanged despite home               

      interventions.                                                                              

                                                                                                  

Historical:                                                                                       

- Allergies:                                                                                      

10:03 No Known Allergies;                                                                     ll1 

- PMHx:                                                                                           

10:03 Hypercholesterolemia; Hypertensive disorder;                                            ll1 

- PSHx:                                                                                           

10:03 bilateral knee replacement;                                                             ll1 

                                                                                                  

- Immunization history:: Adult Immunizations up to date.                                          

- Infectious Disease History:: Denies.                                                            

- Social history:: Smoking status: Patient denies any tobacco usage or history of.                

                                                                                                  

                                                                                                  

ROS:                                                                                              

10:25 Eyes: Negative for injury, pain, redness, and discharge,                                cp  

10:25 Constitutional: Positive for body aches, Negative for fever,                                

10:25 ENT: Positive for sore throat, Negative for drainage from ear(s), ear pain, difficulty      

      swallowing, difficulty handling secretions,                                                 

10:25 Cardiovascular: Negative for chest pain, edema, palpitations,                               

10:25 Respiratory: Positive for cough, "sounds productive",                                       

10:25 Abdomen/GI: Negative for abdominal pain, vomiting, diarrhea, constipation,                  

10:25 Neuro: Negative for altered mental status,                                                  

10:25 All other systems are negative,                                                         cp  

                                                                                                  

Exam:                                                                                             

10:30 Constitutional: The patient appears in no acute distress, alert, awake,                 cp  

      non-diaphoretic, non-toxic, well developed, well nourished,                                 

10:30 Head/Face:  Normocephalic, atraumatic.                                                  cp  

10:30 Eyes: Periorbital structures: appear normal, Conjunctiva: normal, no exudate, no            

      injection, Sclera: no appreciated abnormality, Lids and lashes: appear normal,              

      bilaterally,                                                                                

10:30 ENT: External ear(s): are unremarkable, Ear canal(s): are normal, clear, TM's:              

      dullness, bilaterally, Nose: is normal, Mouth: Lips: moist, Oral mucosa: moist,             

      Posterior pharynx: Airway: no evidence of obstruction, patent, Tonsils: no enlargement,     

      no exudate, erythema, that is mild, Voice: is normal,                                       

10:30 Neck: ROM/movement: Meningeal signs: are not present,                                       

10:30 Chest/axilla: Inspection: normal,                                                           

10:30 Cardiovascular: Rate: normal, Rhythm: regular, Edema: is not appreciated, JVD: is not       

      appreciated,                                                                                

10:30 Respiratory: the patient does not display signs of respiratory distress,  Respirations:     

      normal, no use of accessory muscles, no retractions, labored breathing, is not present,     

      Breath sounds: bronchial sounds, that are mild, are heard diffusely, stridor, is not        

      appreciated, wheezing: is not appreciated,                                                  

10:30 Abdomen/GI: Inspection: abdomen appears normal, Palpation: abdomen is soft and              

      non-tender, in all quadrants,                                                               

10:30 Neuro: Orientation: to person, place \T\ time. Mentation: is normal,                        

                                                                                                  

Vital Signs:                                                                                      

10:08  / 91; Pulse 98; Resp 17; Temp 97.5; Pulse Ox 98% on R/A; Weight 83.91 kg; Height ll1 

      5 ft. 6 in. ; Pain 0/10;                                                                    

11:50  / 74; Pulse 88; Resp 16; Pulse Ox 98% ; Pain 0/10;                               ll1 

10:08 Body Mass Index 29.86 (83.91 kg, 167.64 cm)                                             ll1 

10:08 Pain Scale: Adult                                                                       ll1 

11:50 Pain Scale: Adult                                                                       ll1 

                                                                                                  

MDM:                                                                                              

10:04 Medical Screening Exam initiated                                                        cp  

11:40 Data reviewed: vital signs, nurses notes, lab test result(s), radiologic studies, plain cp  

      films, and as a result, I will discharge patient.                                           

11:40 Differential diagnosis: bronchitis, flu, URI, sepsis, pneumonia. Antibiotic             cp  

      administration: The patient is discharged and will get outpatient antibiotics,              

      Zithromax. I considered the following discharge prescriptions or medication management      

      in the emergency department Medications were administered in the Emergency Department.      

      See MAR. Counseling: I had a detailed discussion with the patient and/or guardian           

      regarding the historical points, exam findings, and any diagnostic results supporting       

      the discharge/admit diagnosis, lab results, radiology results, to return to the             

      emergency department if symptoms worsen or persist or if there are any questions or         

      concerns that arise at home. Response to treatment: the patient's symptoms have mildly      

      improved after treatment, and as a result, I will discharge patient.                        

                                                                                                  

                                                                                             

10:16 Order name: Strep                                                                       cp  

                                                                                             

10:16 Order name: Influenza Screen (a \T\ B); Complete Time: 11:37                              cp

                                                                                             

10:16 Order name: RSV; Complete Time: 11:37                                                   cp  

                                                                                             

10:16 Order name: SARS RAPID; Complete Time: 11:37                                            cp  

                                                                                             

11:18 Order name: Throat Culture                                                              EDMS

                                                                                             

10:16 Order name: XRAY Chest Pa And Lat (2 Views); Complete Time: 11:37                       cp  

                                                                                                  

Administered Medications:                                                                         

10:33 Drug: Tessalon Perle  mg PO once Route: PO;                                       ll1 

11:15 Follow up: Response: No adverse reaction; RASS: Alert and Calm (0)                      ll1 

11:52 Drug: AZITHromycin  mg PO once Route: PO;                                         ll1 

12:04 Follow up: Response: No adverse reaction                                                ll1 

                                                                                                  

                                                                                                  

Disposition:                                                                                      

18:56 Co-signature as Attending Physician, Ramsey Chairez MD I reviewed the patient's care       rn  

      provided by the Advanced Practice Provider and agree with the diagnosis and treatment       

      plan.                                                                                       

                                                                                                  

Disposition Summary:                                                                              

25 11:41                                                                                    

Discharge Ordered                                                                                 

 Notes:       Location: Home                                                                        
  cp

      Problem: new                                                                            cp  

      Symptoms: have improved                                                                 cp  

      Condition: Stable                                                                       cp  

      Diagnosis                                                                                   

        - Pneumonia, unspecified organism                                                     cp  

      Followup:                                                                               cp  

        - With: Private Physician                                                                  

        - When: 2 - 3 days                                                                         

        - Reason: Worsening of condition                                                           

      Discharge Instructions:                                                                     

        - Discharge Summary Sheet                                                             cp  

        - Community-Acquired Pneumonia, Adult                                                 cp  

      Forms:                                                                                      

        - Medication Reconciliation Form                                                      cp  

        - Antibiotic Education                                                                cp  

        - Prescription Opioid Use                                                             cp  

        - Patient Portal Instructions                                                         cp  

        - Leadership Thank You Letter                                                         cp  

      Prescriptions:                                                                              

        - Bromfed DM 2-30-10 mg/5 mL Oral syrup                                                    

            - administer 10 milliliter ORAL route every 6 hours as needed for cold symptoms;  cp  

      240 milliliter; Refills: 0, Product Selection Permitted                                     

        - Ibuprofen 800 mg Oral Tablet                                                             

            - take 1 tablet ORAL route every 8 hours As needed take with food; 30 tablet;     cp  

      Refills: 0, Product Selection Permitted                                                     

        - Zithromax Z-Archie 250 mg Oral Tablet                                                       

            - take 1 tablet ORAL route as directed for 5 days Day 1 - take two (2) tablets    cp  

      one time.  Day 2, 3, 4 , 5 take one (1) tablet once daily.; 6 tablet; Refills: 0,           

      Product Selection Permitted                                                                 

Signatures:                                                                                       

Dispatcher [employer]                           EDMS                                                 

Ramsey Chairez MD MD   rn                                                   

Roberto Mcallister, PA                         PA   cp                                                   

Tracie Huff RN                       RN   ll1                                                  

                                                                                                  

Corrections: (The following items were deleted from the chart)                                    

10:16 10:16 Group A Streptococcus Rapid Sc+BA.LAB.BRZ ordered. EDMS                           EDMS

10:16 10:16 Influenza Screen (A \T\ B)+BA.LAB.BRZ ordered. EDMS                                 EDMS

10:16 10:16 Respiratory Syncytial Virus Ag+BA.LAB.BRZ ordered. EDMS                           EDMS

10:16 10:16 SARS-COV-2 Antigen Rapid+I.LAB.BRZ ordered. EDMS                                  EDMS

10:16 10:16 Chest Pa And Lat (2 Views)+RAD.RAD.BRZ ordered. EDMS                              EDMS

                                                                                             

10:56 10:54 Constitutional: Positive for body aches, Negative for fever, cp                   cp  

10:56 10:54 Respiratory: Positive for cough, "sounds productive", cp                          cp  

10:56 10:54 Cardiovascular: Negative for chest pain, edema, palpitations, cp                  cp  

10:56 10:54 Abdomen/GI: Negative for abdominal pain, vomiting, diarrhea, constipation, cp     cp  

10:56 10:54 Eyes: Negative for injury, pain, redness, and discharge, cp                       cp  

10:56 10:54 ENT: Positive for sore throat, Negative for drainage from ear(s), ear pain,       cp  

      difficulty swallowing, difficulty handling secretions, cp                                   

10:56 10:54 Neuro: Negative for altered mental status, cp                                     cp  

                                                                                                  

**************************************************************************************************

## 2025-01-12 NOTE — ER
Nurse's Notes                                                                                     

 [facility]                                                                 

Name: Dereck Dc                                                                         

Age: 69 yrs                                                                                       

Sex: Male                                                                                         

: 1955                                                                                   

MRN: [id number]                                                                                   

Arrival Date: 2025                                                                          

Time: 09:55                                                                                       

Account#: [id number]                                                                            

Bed 8                                                                                             

Private MD:                                                                                       

Diagnosis: Pneumonia, unspecified organism                                                        

                                                                                                  

Presentation:                                                                                     

                                                                                             

10:08 Chief complaint: Patient states: Cough, sore throat, fatigue, body aches, possible      ll1 

      fever since Tuesday. Coronavirus screen: Client denies travel out of the [place] in the        

      last 14 days. congestion, cough unrelated to allergies, fatigue, fever, sore throat,        

      Client presents with at least one sign or symptom that may indicate coronavirus-19.         

      Standard/surgical mask placed on the client. Ebola Screen: Patient denies travel to an      

      Ebola-affected area in the 21 days before illness onset. Initial Sepsis Screen: Does        

      the patient meet any 2 criteria? No. Patient's initial sepsis screen is negative. Does      

      the patient have a suspected source of infection? No. Patient's initial sepsis screen       

      is negative. Risk Assessment: Do you want to hurt yourself or someone else? Patient         

      reports no desire to harm self or others. Onset of symptoms was 2025.           

10:08 Method Of Arrival: Ambulatory                                                           ll1 

10:08 Acuity: TD 4                                                                           ll1 

                                                                                                  

Triage Assessment:                                                                                

10:09 General: Appears in no apparent distress. Behavior is calm, cooperative, appropriate    ll1 

      for age, Reports fever for feeling ill for fatigue for. Pain: Denies pain. EENT:            

      Reports pain when swallowing. Respiratory: Reports cough that is.                           

                                                                                                  

Historical:                                                                                       

- Allergies:                                                                                      

10:03 No Known Allergies;                                                                     ll1 

- PMHx:                                                                                           

10:03 Hypercholesterolemia; Hypertensive disorder;                                            ll1 

- PSHx:                                                                                           

10:03 bilateral knee replacement;                                                             ll1 

                                                                                                  

- Immunization history:: Adult Immunizations up to date.                                          

- Infectious Disease History:: Denies.                                                            

- Social history:: Smoking status: Patient denies any tobacco usage or history of.                

                                                                                                  

                                                                                                  

Screening:                                                                                        

10:37 [facility] ED Fall Risk Assessment (Adult) History of falling in the last 3 months,       ll1 

      including since admission No falls in past 3 months (0 pts) Confusion or Disorientation     

      No (0 pts) Intoxicated or Sedated No (0 pts) Impaired Gait No (0 pts) Mobility Assist       

      Device Used No (0 pt) Altered Elimination No (0 pt) Score/Fall Risk Level 0 - 2 = Low       

      Risk Maintained a safe environment, Hourly rounding (assess needs \T\ fall precautionary    

      measures) done. Abuse screen: Denies threats or abuse. Nutritional screening: No            

      deficits noted. Tuberculosis screening: No symptoms or risk factors identified.             

                                                                                                  

Assessment:                                                                                       

10:37 Reassessment: No changes from previously documented assessment. Patient and/or family   ll1 

      updated on plan of care and expected duration. Pain level reassessed. Patient is alert,     

      oriented x 3, equal unlabored respirations, skin warm/dry/pink.                             

11:50 Reassessment: No changes from previously documented assessment. Patient and/or family   ll1 

      updated on plan of care and expected duration. Pain level reassessed. Patient is alert,     

      oriented x 3, equal unlabored respirations, skin warm/dry/pink.                             

                                                                                                  

Vital Signs:                                                                                      

10:08  / 91; Pulse 98; Resp 17; Temp 97.5; Pulse Ox 98% on R/A; Weight 83.91 kg; Height ll1 

      5 ft. 6 in. ; Pain 0/10;                                                                    

11:50  / 74; Pulse 88; Resp 16; Pulse Ox 98% ; Pain 0/10;                               ll1 

10:08 Body Mass Index 29.86 (83.91 kg, 167.64 cm)                                             ll1 

10:08 Pain Scale: Adult                                                                       ll1 

11:50 Pain Scale: Adult                                                                       ll1 

                                                                                                  

ED Course:                                                                                        

09:59 Patient arrived in ED.                                                                  al6 

10:02 Roberto Mcallister PA is PHCP.                                                                cp  

10:02 Ramsey Chairez MD is Attending Physician.                                                cp  

10:03 Arm band placed on Patient placed in an exam room, on a stretcher.                      ll1 

10:07 Tracie Huff, RN is Primary Nurse.                                                     ll1 

10:09 Triage completed.                                                                       ll1 

10:24 XRAY Chest Pa And Lat (2 Views) In Process Unspecified.                                 EDMS

10:30 COVID swab sent to lab. Flu and/or RSV swab sent to lab. Strep swab sent to lab.        ll1 

10:37 No provider procedures requiring assistance completed. Patient did not have IV access   ll1 

      during this emergency room visit.                                                           

10:38 Patient has correct armband on for positive identification. Provided Education on: ER   ll1 

      procedures and process.                                                                     

                                                                                                  

Administered Medications:                                                                         

10:33 Drug: Tessalon Perle  mg PO once Route: PO;                                       ll1 

11:15 Follow up: Response: No adverse reaction; RASS: Alert and Calm (0)                      [facility] 

11:52 Drug: AZITHromycin  mg PO once Route: PO;                                         1 

12:04 Follow up: Response: No adverse reaction                                                1 

                                                                                                  

                                                                                                  

Medication:                                                                                       

10:38 VIS not applicable for this client.                                                     1 

                                                                                                  

Outcome:                                                                                          

11:41 Discharge ordered by MD.                                                                deann  

11:52 Patient left the ED.                                                                    1 

11:52 Discharged to home ambulatory,                                                          1 

11:52 Condition: stable                                                                           

11:52 Discharge instructions given to patient, Instructed on discharge instructions, follow       

      up and referral plans. medication usage, Demonstrated understanding of instructions,        

      follow-up care, medications, Prescriptions given X 3,                                       

                                                                                                  

Signatures:                                                                                       

Dispatcher [employer]                           EDMS                                                 

Roberto Mcallister PA PA cp Lewis, Lynsay, RN                       RN   1                                                  

Katina Sandhu                               al6                                                  

                                                                                                  

**************************************************************************************************

## 2025-01-12 NOTE — RAD REPORT
EXAMINATION: TWO VIEW CHEST XR



CLINICAL INDICATION: Male, 69 years old. [facility] MAIN

 COUGH

 Bed Name: 8



TECHNIQUE: 2 view radiographs of the chest were performed. 



COMPARISON: 6/26/2024



FINDINGS:

Right suprahilar patchy opacity, progressive since prior exam, may reflect atelectasis or focal pneum
onia. No pneumothorax or sizable effusion. The heart is normal in size. Mediastinal contours are

unremarkable.



IMPRESSION:

 

Right suprahilar patchy opacity may reflect atelectasis or focal pneumonia.



Reported By: Steve Armenta 

Electronically Signed:  1/12/2025 11:30 AM

## 2025-01-14 VITALS — SYSTOLIC BLOOD PRESSURE: 116 MMHG | OXYGEN SATURATION: 98 % | TEMPERATURE: 97.5 F | DIASTOLIC BLOOD PRESSURE: 91 MMHG
